# Patient Record
Sex: FEMALE | Race: WHITE | Employment: UNEMPLOYED | URBAN - METROPOLITAN AREA
[De-identification: names, ages, dates, MRNs, and addresses within clinical notes are randomized per-mention and may not be internally consistent; named-entity substitution may affect disease eponyms.]

---

## 2021-08-31 ENCOUNTER — OFFICE VISIT (OUTPATIENT)
Dept: NEUROLOGY | Age: 46
End: 2021-08-31
Payer: MEDICARE

## 2021-08-31 VITALS
BODY MASS INDEX: 27.58 KG/M2 | DIASTOLIC BLOOD PRESSURE: 96 MMHG | SYSTOLIC BLOOD PRESSURE: 146 MMHG | HEIGHT: 71 IN | WEIGHT: 197 LBS | HEART RATE: 77 BPM

## 2021-08-31 DIAGNOSIS — M47.816 LUMBAR SPONDYLOSIS: ICD-10-CM

## 2021-08-31 DIAGNOSIS — G37.9 CNS DEMYELINATING DISEASE (HCC): Primary | ICD-10-CM

## 2021-08-31 PROCEDURE — 99204 OFFICE O/P NEW MOD 45 MIN: CPT | Performed by: NURSE PRACTITIONER

## 2021-08-31 RX ORDER — DIAZEPAM 2 MG/1
2 TABLET ORAL EVERY 6 HOURS PRN
COMMUNITY

## 2021-08-31 RX ORDER — NAPROXEN SODIUM 550 MG/1
550 TABLET ORAL PRN
COMMUNITY

## 2021-08-31 RX ORDER — ACETAMINOPHEN 160 MG/5ML
SUSPENSION ORAL
COMMUNITY
Start: 2021-07-22

## 2021-08-31 NOTE — PROGRESS NOTES
time.           Testing reviewed:    CT Temporal Bones WO Contrast 1/11/2021  IMPRESSION:     There is significant thinning of the bony covering over the superior semicircular canal bilaterally suggesting dehiscence.  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 There is evidence of previous right temporal craniotomy, similar to the prior exam.       MRI Lumbar Spine 6/19/2021  Impression:   *  Multilevel degenerative disc and facet disease as described with no acute abnormality. *  Grade 1 anterolisthesis at L4-L5 with significant narrowing of the spinal canal, unchanged. *  Impingement of bilateral L5 nerve roots, also stable.              PAST MEDICAL HISTORY:         Diagnosis Date    GERD (gastroesophageal reflux disease)     Insulin resistance     Obesity     Osteoarthritis     Superior semicircular canal dehiscence     Vasodepressor syncope         PAST SURGICAL HISTORY:         Procedure Laterality Date    CHOLECYSTECTOMY      NASAL SEPTUM SURGERY      TONSILLECTOMY          SOCIAL HISTORY:     Social History     Socioeconomic History    Marital status: Single     Spouse name: Not on file    Number of children: Not on file    Years of education: Not on file    Highest education level: Not on file   Occupational History    Not on file   Tobacco Use    Smoking status: Light Tobacco Smoker     Packs/day: 0.00     Types: Cigarettes    Smokeless tobacco: Never Used   Vaping Use    Vaping Use: Never used   Substance and Sexual Activity    Alcohol use: Yes     Comment: jones pt states that she is willing to avoid for at least 6 [facility]    Drug use: No    Sexual activity: Not on file   Other Topics Concern    Not on file   Social History Narrative    Not on file     Social Determinants of Health     Financial Resource Strain:     Difficulty of Paying Living Expenses:    Food Insecurity:     Worried About Running Out of Food in the Last Year:     Ran Out of Food in the Last Year:    Transportation Needs:     Lack of Transportation (Medical):  Lack of Transportation (Non-Medical):    Physical Activity:     Days of Exercise per Week:     Minutes of Exercise per Session:    Stress:     Feeling of Stress :    Social Connections:     Frequency of Communication with Friends and Family:     Frequency of Social Gatherings with Friends and Family:     Attends [employer]oration[employer] Services:     Active Member of Clubs or Organizations:     Attends Club or Organization Meetings:     Marital Status:    Intimate Partner Violence:     Fear of Current or Ex-Partner:     Emotionally Abused:     Physically Abused:     Sexually Abused:        CURRENT MEDICATIONS:     Current Outpatient Medications   Medication Sig Dispense Refill    naproxen sodium (ANAPROX) 550 MG tablet Take 550 mg by mouth 2 times daily (with meals)      diazePAM (VALIUM) 2 MG tablet Take 2 mg by mouth every 6 hours as needed for Anxiety.  RA ALLERGY RELIEF, CETIRIZINE, 10 MG tablet take 1 tablet by mouth once daily if needed ALLERGY      Omeprazole (PRILOSEC PO) Take 40 mg by mouth daily. No current facility-administered medications for this visit. ALLERGIES:     Allergies   Allergen Reactions    Decongest-Aid [Pseudoephedrine]     Erythromycin     Predicort [Prednisolone]     Scopolamine     Soybean-Containing Drug Products     Tofranil [Imipramine Hcl]                           All items selected indicate a positive finding. Those items not selected are negative.   Constitutional [] Weight loss/gain   [] Fatigue  [] Fever/Chills   HEENT [] Hearing Loss  [] Visual Disturbance  [x] Tinnitus  [] Eye pain   Respiratory [] Shortness of Breath  [] Cough  [] Snoring   Cardiovascular [] Chest Pain  [] Palpitations  [] Lightheaded   GI [] Constipation  [] Diarrhea  [] Swallowing change  [] Nausea/vomiting    [x] Urinary Frequency  [x] Urinary Urgency   Musculoskeletal [x] Neck pain  [x] Back pain  [x] Muscle pain  [] Restless legs   Dermatologic [] Skin changes   Neurologic [] Memory loss/confusion  [] Seizures  [x] Trouble walking or imbalance  [x] Dizziness  [] Sleep disturbance  [x] Weakness  [x] Numbness  [] Tremors  [] Speech Difficulty  [] Headaches  [] Light Sensitivity  [] Sound Sensitivity   Endocrinology []Excessive thirst  []Excessive hunger   Psychiatric [] Anxiety/Depression  [] Hallucination   Allergy/immunology []Hives/environmental allergies   Hematologic/lymph [] Abnormal bleeding  [] Abnormal bruising                   PHYSICAL EXAMINATION:       Vitals:    08/31/21 0829   BP: (!) 146/96   Pulse: 77                                              .                                                                                                    General Appearance:  Alert, cooperative, no signs of distress, appears stated age   Head:  Normocephalic, no signs of trauma   Eyes:  Conjunctiva/corneas clear;  eyelids intact   Ears:  Normal external ear and canals   Nose: Nares normal, mucosa normal, no drainage    Throat: Lips and tongue normal; teeth normal;  gums normal   Neck: Supple, intact flexion, extension and rotation;   trachea midline;  no adenopathy;   thyroid: not enlarged;   no carotid pulse abnormality   Back:   Symmetric, no curvature, ROM adequate   Lungs:   Respirations unlabored   Heart:  Regular rate and rhythm           Extremities: Extremities normal, no cyanosis, no edema   Pulses: Symmetric over head and neck   Skin: Skin color, texture normal, no rashes, no lesions                                     NEUROLOGIC EXAMINATION    Neurologic Exam    Mental status    Alert and oriented x 3; intact memory with no confusion, speech or language problems; no hallucinations or delusions  Fund of information appropriate for level of education    Cranial nerves    II - visual fields intact to confrontation  III, IV, VI - extra-ocular muscles full: no pupillary defect; no CRISTINA, no nystagmus, no ptosis   V - normal facial sensation                                                               VII - normal facial symmetry                                                             VIII - intact hearing                                                                             IX, X - symmetrical palate                                                                  XI - symmetrical shoulder shrug                                                       XII - tongue midline without atrophy or fasciculation      Motor function  Normal muscle bulk and tone; strength 5/5 on all 4 extremities, no pronator drift      Sensory function Diminished pinprick sensation in left upper and lower extremity      Cerebellar Intact fine motor movement. No involuntary movements or tremors. No ataxia or dysmetria on finger to nose or heel to shin testing      Reflex function DTR 2+ on bilateral UE and LE, symmetric. Negative Babinski, negative ramírez, negative clonus       Gait                   normal base and arm swing              Medical Decision Making: Thank you very much for the kind referral of [facility]. I look forward to working with you in the neurological care of your patient. L4-5 spondylolisthesis with spinal stenosis. Patient has recently seen orthopedic surgery who recommended an L3-L5 spinal fusion and laminectomy. The patient is reluctant and would like to have a second opinion  Referral to Dr. Jonathan Cleary was made at today's visit  Bilateral superior semicircular canal dehiscence  The patient previously had a plate placed on the right and has a surgery in the near future for the left  Possible multiple sclerosis, the patient previously had a lumbar puncture however she is unsure if she had positive oligoclonal bands. She did have lesions on her brain MRI, but is uncertain regarding her spine.   On clinical exam, the patient currently has hemisensory loss on the left.  Obtain records from Dr. Lizz Pan and Dr. Kristy Keating  MRI of the brain with and without contrast  MRI of the cervical spine with and without contrast    Signed: Cheyenne Serrano CNP  Please note that this chart was generated using voice recognition Dragon dictation software. Although every effort was made to ensure the accuracy of this automated transcription, some errors in transcription may have occurred. Provider Attestation: The documentation recorded by the scribe accurately reflects the service I personally performed and the decisions made by myself. Portions of this note were transcribed by a scribe. I personally performed the history, physical exam, and medical decision-making and confirm the accuracy of the information in the transcribed note. Scribe Attestation:   By signing my name below, Branden Galan, attest that this documentation has been prepared under the direction and in the presence of Cheyenne Serrano CNP.

## 2021-09-11 ENCOUNTER — HOSPITAL ENCOUNTER (EMERGENCY)
Facility: CLINIC | Age: 46
Discharge: HOME | End: 2021-09-11
Attending: EMERGENCY MEDICINE
Payer: MEDICARE

## 2021-09-11 ENCOUNTER — APPOINTMENT (OUTPATIENT)
Dept: CT IMAGING | Facility: CLINIC | Age: 46
End: 2021-09-11
Payer: MEDICARE

## 2021-09-11 VITALS
BODY MASS INDEX: 26.88 KG/M2 | HEART RATE: 99 BPM | DIASTOLIC BLOOD PRESSURE: 93 MMHG | HEIGHT: 71 IN | TEMPERATURE: 98.5 F | SYSTOLIC BLOOD PRESSURE: 140 MMHG | OXYGEN SATURATION: 100 % | WEIGHT: 192 LBS | RESPIRATION RATE: 18 BRPM

## 2021-09-11 DIAGNOSIS — E86.0 DEHYDRATION: ICD-10-CM

## 2021-09-11 DIAGNOSIS — R19.7 DIARRHEA OF PRESUMED INFECTIOUS ORIGIN: Primary | ICD-10-CM

## 2021-09-11 LAB
ABSOLUTE EOS #: 0.1 K/UL (ref 0–0.4)
ABSOLUTE IMMATURE GRANULOCYTE: ABNORMAL K/UL (ref 0–0.3)
ABSOLUTE LYMPH #: 0.7 K/UL (ref 1–4.8)
ABSOLUTE MONO #: 0.7 K/UL (ref 0.1–1.2)
ALBUMIN SERPL-MCNC: 4.5 G/DL (ref 3.5–5.2)
ALBUMIN/GLOBULIN RATIO: 1.3 (ref 1–2.5)
ALP BLD-CCNC: 75 U/L (ref 35–104)
ALT SERPL-CCNC: 12 U/L (ref 5–33)
ANION GAP SERPL CALCULATED.3IONS-SCNC: 15 MMOL/L (ref 9–17)
AST SERPL-CCNC: 30 U/L
BASOPHILS # BLD: 0 % (ref 0–2)
BASOPHILS ABSOLUTE: 0 K/UL (ref 0–0.2)
BILIRUB SERPL-MCNC: 0.6 MG/DL (ref 0.3–1.2)
BILIRUBIN URINE: NEGATIVE
BUN BLDV-MCNC: 7 MG/DL (ref 6–20)
BUN/CREAT BLD: ABNORMAL (ref 9–20)
CALCIUM SERPL-MCNC: 9.6 MG/DL (ref 8.6–10.4)
CHLORIDE BLD-SCNC: 103 MMOL/L (ref 98–107)
CO2: 19 MMOL/L (ref 20–31)
COLOR: YELLOW
COMMENT UA: ABNORMAL
CREAT SERPL-MCNC: 0.5 MG/DL (ref 0.5–0.9)
DATE, STOOL #1: NORMAL
DATE, STOOL #2: NORMAL
DATE, STOOL #3: NORMAL
DIFFERENTIAL TYPE: ABNORMAL
DIRECT EXAM: NORMAL
EOSINOPHILS RELATIVE PERCENT: 1 % (ref 1–4)
GFR AFRICAN AMERICAN: >60 ML/MIN
GFR NON-AFRICAN AMERICAN: >60 ML/MIN
GFR SERPL CREATININE-BSD FRML MDRD: ABNORMAL ML/MIN/{1.73_M2}
GFR SERPL CREATININE-BSD FRML MDRD: ABNORMAL ML/MIN/{1.73_M2}
GLUCOSE BLD-MCNC: 89 MG/DL (ref 70–99)
GLUCOSE URINE: NEGATIVE
HCG QUALITATIVE: NEGATIVE
HCT VFR BLD CALC: 34.5 % (ref 36–46)
HEMOCCULT SP1 STL QL: NEGATIVE
HEMOCCULT SP2 STL QL: NORMAL
HEMOCCULT SP3 STL QL: NORMAL
HEMOGLOBIN: 10.8 G/DL (ref 12–16)
IMMATURE GRANULOCYTES: ABNORMAL %
KETONES, URINE: ABNORMAL
LEUKOCYTE ESTERASE, URINE: NEGATIVE
LIPASE: 37 U/L (ref 13–60)
LYMPHOCYTES # BLD: 8 % (ref 24–44)
Lab: NORMAL
MCH RBC QN AUTO: 22.9 PG (ref 26–34)
MCHC RBC AUTO-ENTMCNC: 31.2 G/DL (ref 31–37)
MCV RBC AUTO: 73.3 FL (ref 80–100)
MONOCYTES # BLD: 8 % (ref 2–11)
NITRITE, URINE: NEGATIVE
NRBC AUTOMATED: ABNORMAL PER 100 WBC
PDW BLD-RTO: 18.6 % (ref 12.5–15.4)
PH UA: 5 (ref 5–8)
PLATELET # BLD: 215 K/UL (ref 140–450)
PLATELET ESTIMATE: ABNORMAL
PMV BLD AUTO: 9 FL (ref 6–12)
POTASSIUM SERPL-SCNC: 3.9 MMOL/L (ref 3.7–5.3)
PROTEIN UA: NEGATIVE
RBC # BLD: 4.71 M/UL (ref 4–5.2)
RBC # BLD: ABNORMAL 10*6/UL
SEG NEUTROPHILS: 83 % (ref 36–66)
SEGMENTED NEUTROPHILS ABSOLUTE COUNT: 7.7 K/UL (ref 1.8–7.7)
SODIUM BLD-SCNC: 137 MMOL/L (ref 135–144)
SPECIFIC GRAVITY UA: 1.02 (ref 1–1.03)
SPECIMEN DESCRIPTION: NORMAL
TIME, STOOL #1: 1523
TIME, STOOL #2: NORMAL
TIME, STOOL #3: NORMAL
TOTAL PROTEIN: 8.1 G/DL (ref 6.4–8.3)
TURBIDITY: CLEAR
URINE HGB: NEGATIVE
UROBILINOGEN, URINE: NORMAL
WBC # BLD: 9.2 K/UL (ref 3.5–11)
WBC # BLD: ABNORMAL 10*3/UL

## 2021-09-11 PROCEDURE — 74177 CT ABD & PELVIS W/CONTRAST: CPT

## 2021-09-11 PROCEDURE — 85025 COMPLETE CBC W/AUTO DIFF WBC: CPT

## 2021-09-11 PROCEDURE — 81003 URINALYSIS AUTO W/O SCOPE: CPT

## 2021-09-11 PROCEDURE — 2580000003 HC RX 258: Performed by: EMERGENCY MEDICINE

## 2021-09-11 PROCEDURE — 87328 CRYPTOSPORIDIUM AG IA: CPT

## 2021-09-11 PROCEDURE — 96360 HYDRATION IV INFUSION INIT: CPT

## 2021-09-11 PROCEDURE — 6360000004 HC RX CONTRAST MEDICATION: Performed by: EMERGENCY MEDICINE

## 2021-09-11 PROCEDURE — 99284 EMERGENCY DEPT VISIT MOD MDM: CPT

## 2021-09-11 PROCEDURE — 87329 GIARDIA AG IA: CPT

## 2021-09-11 PROCEDURE — 80053 COMPREHEN METABOLIC PANEL: CPT

## 2021-09-11 PROCEDURE — 36415 COLL VENOUS BLD VENIPUNCTURE: CPT

## 2021-09-11 PROCEDURE — 6360000002 HC RX W HCPCS: Performed by: EMERGENCY MEDICINE

## 2021-09-11 PROCEDURE — 87425 ROTAVIRUS AG IA: CPT

## 2021-09-11 PROCEDURE — 96372 THER/PROPH/DIAG INJ SC/IM: CPT

## 2021-09-11 PROCEDURE — 83690 ASSAY OF LIPASE: CPT

## 2021-09-11 PROCEDURE — 87506 IADNA-DNA/RNA PROBE TQ 6-11: CPT

## 2021-09-11 PROCEDURE — 96361 HYDRATE IV INFUSION ADD-ON: CPT

## 2021-09-11 PROCEDURE — 87493 C DIFF AMPLIFIED PROBE: CPT

## 2021-09-11 PROCEDURE — 84703 CHORIONIC GONADOTROPIN ASSAY: CPT

## 2021-09-11 PROCEDURE — 82272 OCCULT BLD FECES 1-3 TESTS: CPT

## 2021-09-11 RX ORDER — SODIUM CHLORIDE 0.9 % (FLUSH) 0.9 %
10 SYRINGE (ML) INJECTION PRN
Status: DISCONTINUED | OUTPATIENT
Start: 2021-09-11 | End: 2021-09-11 | Stop reason: HOSPADM

## 2021-09-11 RX ORDER — DICYCLOMINE HYDROCHLORIDE 10 MG/ML
20 INJECTION INTRAMUSCULAR ONCE
Status: COMPLETED | OUTPATIENT
Start: 2021-09-11 | End: 2021-09-11

## 2021-09-11 RX ORDER — DICYCLOMINE HYDROCHLORIDE 10 MG/1
20 CAPSULE ORAL EVERY 6 HOURS PRN
Qty: 30 CAPSULE | Refills: 0 | Status: SHIPPED | OUTPATIENT
Start: 2021-09-11

## 2021-09-11 RX ORDER — 0.9 % SODIUM CHLORIDE 0.9 %
1000 INTRAVENOUS SOLUTION INTRAVENOUS ONCE
Status: COMPLETED | OUTPATIENT
Start: 2021-09-11 | End: 2021-09-11

## 2021-09-11 RX ORDER — 0.9 % SODIUM CHLORIDE 0.9 %
70 INTRAVENOUS SOLUTION INTRAVENOUS ONCE
Status: COMPLETED | OUTPATIENT
Start: 2021-09-11 | End: 2021-09-11

## 2021-09-11 RX ADMIN — Medication 10 ML: at 12:56

## 2021-09-11 RX ADMIN — SODIUM CHLORIDE 1000 ML: 9 INJECTION, SOLUTION INTRAVENOUS at 12:22

## 2021-09-11 RX ADMIN — SODIUM CHLORIDE 1000 ML: 9 INJECTION, SOLUTION INTRAVENOUS at 14:08

## 2021-09-11 RX ADMIN — SODIUM CHLORIDE 70 ML: 9 INJECTION, SOLUTION INTRAVENOUS at 12:56

## 2021-09-11 RX ADMIN — IOPAMIDOL 75 ML: 755 INJECTION, SOLUTION INTRAVENOUS at 12:55

## 2021-09-11 RX ADMIN — DICYCLOMINE HYDROCHLORIDE 20 MG: 10 INJECTION, SOLUTION INTRAMUSCULAR at 12:22

## 2021-09-11 ASSESSMENT — PAIN SCALES - GENERAL: PAINLEVEL_OUTOF10: 4

## 2021-09-11 ASSESSMENT — PAIN DESCRIPTION - LOCATION: LOCATION: ABDOMEN

## 2021-09-11 ASSESSMENT — PAIN DESCRIPTION - PAIN TYPE: TYPE: ACUTE PAIN

## 2021-09-11 NOTE — ED PROVIDER NOTES
predicort [prednisolone], scopolamine, soybean-containing drug products, and tofranil [imipramine hcl]. FAMILY HISTORY     She indicated that the status of her mother is unknown. She indicated that the status of her maternal grandmother is unknown. She indicated that the status of her maternal grandfather is unknown.     family history includes Arthritis in her mother; Cancer in her maternal grandfather; Depression in her mother; Diabetes in her maternal grandmother; High Cholesterol in her mother. SOCIAL HISTORY      reports that she has been smoking cigarettes. She has been smoking about 0.00 packs per day. She has never used smokeless tobacco. She reports current alcohol use. She reports that she does not use drugs. PHYSICAL EXAM     INITIAL VITALS:  height is 5' 11\" (1.803 m) and weight is 87.1 kg (192 lb). Her oral temperature is 98.5 °F (36.9 °C). Her blood pressure is 140/93 (abnormal) and her pulse is 99. Her respiration is 18 and oxygen saturation is 100%. Physical Exam  Vitals reviewed. Constitutional:       General: She is not in acute distress. Appearance: She is well-developed. HENT:      Head: Normocephalic and atraumatic. Eyes:      Conjunctiva/sclera: Conjunctivae normal.      Pupils: Pupils are equal, round, and reactive to light. Neck:      Trachea: No tracheal deviation. Cardiovascular:      Rate and Rhythm: Normal rate and regular rhythm. Pulmonary:      Effort: No respiratory distress. Breath sounds: Normal breath sounds. Abdominal:      General: Bowel sounds are normal. There is no distension. Palpations: Abdomen is soft. Tenderness: There is no abdominal tenderness. Comments: While her abdomen is soft and nontender to palpation, after I palpated she starts clutching her belly complaining of cramping pain. Musculoskeletal:         General: No tenderness. Normal range of motion. Cervical back: Normal range of motion and neck supple. Skin:     General: Skin is warm and dry. Neurological:      Mental Status: She is alert and oriented to person, place, and time. Psychiatric:         Behavior: Behavior normal.         Thought Content: Thought content normal.         Judgment: Judgment normal.           DIFFERENTIAL DIAGNOSIS/ MDM:   I am worried about infectious or parasitic diarrhea causing this patient's presenting complaint. DIAGNOSTIC RESULTS     EKG:  None    RADIOLOGY:   CT ABDOMEN PELVIS W IV CONTRAST Additional Contrast? None    Result Date: 9/11/2021  EXAMINATION: CT OF THE ABDOMEN AND PELVIS WITH CONTRAST 9/11/2021 12:41 pm TECHNIQUE: CT of the abdomen and pelvis was performed with the administration of intravenous contrast. Multiplanar reformatted images are provided for review. Dose modulation, iterative reconstruction, and/or weight based adjustment of the mA/kV was utilized to reduce the radiation dose to as low as reasonably achievable. COMPARISON: None. HISTORY: ORDERING SYSTEM PROVIDED HISTORY: Upper abdominal pain TECHNOLOGIST PROVIDED HISTORY: Upper abdominal pain Decision Support Exception - unselect if not a suspected or confirmed emergency medical condition->Emergency Medical Condition (MA) Reason for Exam: Pt. C/o abdominal pain and diarrhea. Acuity: Acute Type of Exam: Initial FINDINGS: Lower Chest: Inferior lung bases are clear. Heart size is normal. Organs: Slight to mild diffuse low-density of the liver is present without worrisome focal lesion. Gallbladder surgically absent. Kidneys and other abdominal organs appear normal.  2 displaced surgical clips adjacent to the upper medial portion of the spleen. GI/Bowel: Suture line present on the stomach suggestive of gastric sleeve surgery. Appendix and terminal ileum appear normal.  Colon is filled with liquid stool. Mild fluid-filled distension of pelvic small bowel loops also present. Wall thickening evident in these loops.  Pelvis: Uterus and urinary bladder appear normal.  No mass, adenopathy, or free fluid. Peritoneum/Retroperitoneum: No mass, adenopathy, or ascites. Normal aorta. No free air. Bones/Soft Tissues: No acute abnormality. Fluid-filled distal small bowel and colon suggestive of acute enterocolitis. No other significant abnormality identified.          LABS:  Results for orders placed or performed during the hospital encounter of 09/11/21   CBC Auto Differential   Result Value Ref Range    WBC 9.2 3.5 - 11.0 k/uL    RBC 4.71 4.0 - 5.2 m/uL    Hemoglobin 10.8 (L) 12.0 - 16.0 g/dL    Hematocrit 34.5 (L) 36 - 46 %    MCV 73.3 (L) 80 - 100 fL    MCH 22.9 (L) 26 - 34 pg    MCHC 31.2 31 - 37 g/dL    RDW 18.6 (H) 12.5 - 15.4 %    Platelets 750 212 - 186 k/uL    MPV 9.0 6.0 - 12.0 fL    NRBC Automated NOT REPORTED per 100 WBC    Differential Type NOT REPORTED     Seg Neutrophils 83 (H) 36 - 66 %    Lymphocytes 8 (L) 24 - 44 %    Monocytes 8 2 - 11 %    Eosinophils % 1 1 - 4 %    Basophils 0 0 - 2 %    Immature Granulocytes NOT REPORTED 0 %    Segs Absolute 7.70 1.8 - 7.7 k/uL    Absolute Lymph # 0.70 (L) 1.0 - 4.8 k/uL    Absolute Mono # 0.70 0.1 - 1.2 k/uL    Absolute Eos # 0.10 0.0 - 0.4 k/uL    Basophils Absolute 0.00 0.0 - 0.2 k/uL    Absolute Immature Granulocyte NOT REPORTED 0.00 - 0.30 k/uL    WBC Morphology NOT REPORTED     RBC Morphology NOT REPORTED     Platelet Estimate NOT REPORTED    Comprehensive Metabolic Panel w/ Reflex to MG   Result Value Ref Range    Glucose 89 70 - 99 mg/dL    BUN 7 6 - 20 mg/dL    CREATININE 0.50 0.50 - 0.90 mg/dL    Bun/Cre Ratio NOT REPORTED 9 - 20    Calcium 9.6 8.6 - 10.4 mg/dL    Sodium 137 135 - 144 mmol/L    Potassium 3.9 3.7 - 5.3 mmol/L    Chloride 103 98 - 107 mmol/L    CO2 19 (L) 20 - 31 mmol/L    Anion Gap 15 9 - 17 mmol/L    Alkaline Phosphatase 75 35 - 104 U/L    ALT 12 5 - 33 U/L    AST 30 <32 U/L    Total Bilirubin 0.60 0.3 - 1.2 mg/dL    Total Protein 8.1 6.4 - 8.3 g/dL    Albumin 4.5 3.5 - 5.2 g/dL Weight: 87.1 kg (192 lb)    Height: 5' 11\" (1.803 m)      -------------------------  BP: (!) 140/93, Temp: 98.5 °F (36.9 °C), Pulse: 99, Resp: 18      Re-evaluation Notes  3:14 PM EDT  Patient states that she feels better. Belly remains nonsurgical.  I feel most likely symptoms are viral.  She thinks that she can produce a little diarrhea here and I will have the lab interrogated. Otherwise I will treat her symptomatically. CONSULTS:  None    CRITICAL CARE:   None    PROCEDURES:  None    FINAL IMPRESSION      1. Diarrhea of presumed infectious origin    2.  Dehydration          DISPOSITION/PLAN   DISPOSITION Decision To Discharge 09/11/2021 03:15:11 PM      Condition on Disposition  Good    PATIENT REFERRED TO:  Latricia Epley, [address]  [phone]    Schedule an appointment as soon as possible for a visit in 2 days        DISCHARGE MEDICATIONS:  [unfilled]    (Please note that portions of this note were completed with a voice recognitionprogram.  Efforts were made to edit the dictations but occasionally words are mis-transcribed.)    Rolanda Menon MD MD, F.A.C.E.P, F.A.A.E.M  Emergency Physician Attending         Rolanda Menon MD  09/11/21 [id number]

## 2021-09-12 LAB
C DIFFICILE TOXINS, PCR: NORMAL
CAMPYLOBACTER PCR: ABNORMAL
E COLI ENTEROTOXIGENIC PCR: ABNORMAL
PLESIOMONAS SHIGELLOIDES PCR: ABNORMAL
SALMONELLA PCR: ABNORMAL
SHIGATOXIN GENE PCR: ABNORMAL
SHIGELLA SP PCR: ABNORMAL
SPECIMEN DESCRIPTION: ABNORMAL
SPECIMEN DESCRIPTION: NORMAL
VIBRIO PCR: ABNORMAL
YERSINIA ENTEROCOLITICA PCR: ABNORMAL

## 2021-09-13 LAB
DIRECT EXAM: NORMAL
DIRECT EXAM: NORMAL
Lab: NORMAL
SPECIMEN DESCRIPTION: NORMAL

## 2021-10-27 ENCOUNTER — HOSPITAL ENCOUNTER (OUTPATIENT)
Dept: MRI IMAGING | Facility: CLINIC | Age: 46
Discharge: HOME | End: 2021-10-29
Payer: MEDICARE

## 2021-10-27 DIAGNOSIS — G37.9 CNS DEMYELINATING DISEASE (HCC): ICD-10-CM

## 2021-10-29 ENCOUNTER — HOSPITAL ENCOUNTER (OUTPATIENT)
Dept: MRI IMAGING | Age: 46
Discharge: HOME | End: 2021-10-31
Payer: MEDICARE

## 2021-10-29 PROCEDURE — 72156 MRI NECK SPINE W/O & W/DYE: CPT

## 2021-10-29 PROCEDURE — 70553 MRI BRAIN STEM W/O & W/DYE: CPT

## 2021-10-29 PROCEDURE — A9579 GAD-BASE MR CONTRAST NOS,1ML: HCPCS | Performed by: NURSE PRACTITIONER

## 2021-10-29 PROCEDURE — 2580000003 HC RX 258: Performed by: NURSE PRACTITIONER

## 2021-10-29 PROCEDURE — 6360000004 HC RX CONTRAST MEDICATION: Performed by: NURSE PRACTITIONER

## 2021-10-29 RX ORDER — SODIUM CHLORIDE 0.9 % (FLUSH) 0.9 %
10 SYRINGE (ML) INJECTION PRN
Status: DISCONTINUED | OUTPATIENT
Start: 2021-10-29 | End: 2021-11-01 | Stop reason: HOSPADM

## 2021-10-29 RX ADMIN — GADOTERIDOL 20 ML: 279.3 INJECTION, SOLUTION INTRAVENOUS at 13:03

## 2021-10-29 RX ADMIN — SODIUM CHLORIDE, PRESERVATIVE FREE 10 ML: 5 INJECTION INTRAVENOUS at 13:03

## 2021-11-01 ENCOUNTER — OFFICE VISIT (OUTPATIENT)
Dept: NEUROLOGY | Age: 46
End: 2021-11-01
Payer: MEDICARE

## 2021-11-01 VITALS
BODY MASS INDEX: 26.6 KG/M2 | DIASTOLIC BLOOD PRESSURE: 93 MMHG | WEIGHT: 190 LBS | HEIGHT: 71 IN | HEART RATE: 77 BPM | SYSTOLIC BLOOD PRESSURE: 148 MMHG

## 2021-11-01 DIAGNOSIS — M47.22 OSTEOARTHRITIS OF SPINE WITH RADICULOPATHY, CERVICAL REGION: ICD-10-CM

## 2021-11-01 DIAGNOSIS — H83.8X9 SUPERIOR SEMICIRCULAR CANAL DEHISCENCE, UNSPECIFIED LATERALITY: ICD-10-CM

## 2021-11-01 DIAGNOSIS — M47.816 LUMBAR SPONDYLOSIS: Primary | ICD-10-CM

## 2021-11-01 PROBLEM — G37.9 CNS DEMYELINATING DISEASE (HCC): Status: RESOLVED | Noted: 2021-08-31 | Resolved: 2021-11-01

## 2021-11-01 PROCEDURE — 99214 OFFICE O/P EST MOD 30 MIN: CPT | Performed by: NURSE PRACTITIONER

## 2021-11-01 RX ORDER — GABAPENTIN 300 MG/1
300 CAPSULE ORAL 3 TIMES DAILY
Qty: 90 CAPSULE | Refills: 5 | Status: SHIPPED | OUTPATIENT
Start: 2021-11-01 | End: 2022-04-30

## 2021-11-01 NOTE — PROGRESS NOTES
[facility]            [address]          [place], [address] [facility]          Dept: [phone]          Dept Fax: [phone]    MD Tomas Herzog MD Ahmed B. Valeda Ip, MD Tomas Goldstein, MD Eladio Null, CNP            11/1/2021      HISTORY OF PRESENT ILLNESS:       I had the pleasure of seeing Sandrine Ramesh, who returns for continuing neurologic care. The patient was seen last on August 31, 2021 for treatment of L4-5 spondylolisthesis with spinal stenosis, bilateral superior semicircular dehiscence and possible multiple sclerosis. For management of her L4-5 spondylolisthesis she was referred to Dr. Chana Lopez at her last visit who recommended a L4-5 and L5-S1 decompressive laminectomy. She reports today that she has surgery scheduled in the future. For management of her possible multiple sclerosis she recently completed an MRI of her cervical spine which showed multilevel cervical spondylosis most pronounced at C5-6 resulting in mild right cord compression. There was no evidence of demyelination on her MRI. She also completed an MRI of her brain which was normal except for evidence of a prior craniotomy. For management of her bilateral superior semicircular canal dehiscence she previously had a plate placed on the right and has surgery in the near future for the left. Testing reviewed:    MRI Brain W WO Contrast 10/29/2021  Impression   1.  No acute intracranial abnormality. 2. No evidence of demyelinating disease. 3. Minimal gliosis in the inferior right temporal lobe, adjacent to a site of   right temporal craniotomy.  Please correlate with prior surgical history. 4. 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 Multilevel cervical spondylosis most pronounced at C5-6 resulting in   eccentric right mild cord compression. 2. Severe right foraminal stenosis at C5-6.   3. No abnormal cord signal to suggest demyelination. PAST MEDICAL HISTORY:         Diagnosis Date    GERD (gastroesophageal reflux disease)     Insulin resistance     Obesity     Osteoarthritis     Superior semicircular canal dehiscence     Vasodepressor syncope         PAST SURGICAL HISTORY:         Procedure Laterality Date    CHOLECYSTECTOMY      NASAL SEPTUM SURGERY      TONSILLECTOMY          SOCIAL HISTORY:     Social History     Socioeconomic History    Marital status:      Spouse name: Not on file    Number of children: Not on file    Years of education: Not on file    Highest education level: Not on file   Occupational History    Not on file   Tobacco Use    Smoking status: Light Tobacco Smoker     Packs/day: 0.00     Types: Cigarettes    Smokeless tobacco: Never Used   Vaping Use    Vaping Use: Never used   Substance and Sexual Activity    Alcohol use: Yes     Comment: jones pt states that she is willing to avoid for at least 6 monspost jaime surg    Drug use: No    Sexual activity: Not on file   Other Topics Concern    Not on file   Social History Narrative    Not on file     Social Determinants of Health     Financial Resource Strain:     Difficulty of Paying Living Expenses:    Food Insecurity:     Worried About Running Out of Food in the Last Year:     Leroy of Food in the Last Year:    Transportation Needs:     Lack of Transportation (Medical):      Lack of Transportation (Non-Medical):    Physical Activity:     Days of Exercise per Week:     Minutes of Exercise per Session:    Stress:     Feeling of Stress :    Social Connections:     Frequency of Communication with Friends and Family:     Frequency of Social Gatherings with Friends and Family:     Attends Bahai Services:     Active Member of Clubs or Organizations:     Attends Club or Organization Meetings:     Marital Status:    Intimate Partner Violence:     Fear of Current or Ex-Partner:     Emotionally Abused:     Physically Abused:     Sexually Abused:        CURRENT MEDICATIONS:     Current Outpatient Medications   Medication Sig Dispense Refill    gabapentin (NEURONTIN) 300 MG capsule Take 1 capsule by mouth 3 times daily for 180 days. Intended supply: 30 days 90 capsule 5    naproxen sodium (ANAPROX) 550 MG tablet Take 550 mg by mouth as needed       diazePAM (VALIUM) 2 MG tablet Take 2 mg by mouth every 6 hours as needed for Anxiety.  Omeprazole (PRILOSEC PO) Take 40 mg by mouth daily.  dicyclomine (BENTYL) 10 MG capsule Take 2 capsules by mouth every 6 hours as needed (cramps) (Patient not taking: Reported on 11/1/2021) 30 capsule 0    RA ALLERGY RELIEF, CETIRIZINE, 10 MG tablet take 1 tablet by mouth once daily if needed ALLERGY (Patient not taking: Reported on 11/1/2021)       No current facility-administered medications for this visit. ALLERGIES:     Allergies   Allergen Reactions    Decongest-Aid [Pseudoephedrine]     Erythromycin     Predicort [Prednisolone]     Scopolamine     Soybean-Containing Drug Products     Tofranil [Imipramine Hcl]                                  REVIEW OF SYSTEMS        All items selected indicate a positive finding. Those items not selected are negative.   Constitutional [] Weight loss/gain   [] Fatigue  [] Fever/Chills   HEENT [] Hearing Loss  [] Visual Disturbance  [] Tinnitus  [] Eye pain   Respiratory [] Shortness of Breath  [] Cough  [] Snoring   Cardiovascular [] Chest Pain  [] Palpitations  [] Lightheaded   GI [] Constipation  [] Diarrhea  [] Swallowing change  [] Nausea/vomiting    [] Urinary Frequency  [] Urinary Urgency   Musculoskeletal [x] Neck pain  [x] Back pain  [x] Muscle pain  [] Restless legs   Dermatologic [] Skin changes   Neurologic [] Memory loss/confusion  [] Seizures  [x] Trouble walking or imbalance  [x] Dizziness  [x] Sleep disturbance  [x] Weakness  [x] Numbness  [] Tremors  [] Speech Difficulty  [] Headaches  [] Light Sensitivity  [] Sound Sensitivity   Endocrinology []Excessive thirst  []Excessive hunger   Psychiatric [] Anxiety/Depression  [] Hallucination   Allergy/immunology []Hives/environmental allergies   Hematologic/lymph [] Abnormal bleeding  [] Abnormal bruising         PHYSICAL EXAMINATION:       Vitals:    11/01/21 0839   BP: (!) 148/93   Pulse: 77                                              .                                                                                                    General Appearance:  Alert, cooperative, no signs of distress, appears stated age   Head:  Normocephalic, no signs of trauma   Eyes:  Conjunctiva/corneas clear;  eyelids intact   Ears:  Normal external ear and canals   Nose: Nares normal, mucosa normal, no drainage    Throat: Lips and tongue normal; teeth normal;  gums normal   Neck: Supple, intact flexion, extension and rotation;   trachea midline;  no adenopathy;   thyroid: not enlarged;   no carotid pulse abnormality   Back:   Symmetric, no curvature, ROM adequate   Lungs:   Respirations unlabored   Heart:  Regular rate and rhythm           Extremities: Extremities normal, no cyanosis, no edema   Pulses: Symmetric over head and neck   Skin: Skin color, texture normal, no rashes, no lesions                                     NEUROLOGIC EXAMINATION    Neurologic Exam  Mental status    Alert and oriented x 3; intact memory with no confusion, speech or language problems; no hallucinations or delusions  Fund of information appropriate for level of education    Cranial nerves    II - visual fields intact to confrontation bilaterally  III, IV, VI - extra-ocular muscles full: no pupillary defect; no CRISTINA, no nystagmus, no ptosis   V - normal facial sensation                                                               VII - normal facial symmetry VIII - intact hearing                                                                             IX, X - symmetrical palate                                                                  XI - symmetrical shoulder shrug                                                       XII - tongue midline without atrophy or fasciculation      Motor function  Normal muscle bulk and tone; strength 5/5 on all 4 extremities, no pronator drift      Sensory function Intact to light touch, pinprick, vibration, proprioception on all 4 extremities      Cerebellar Intact fine motor movement. No involuntary movements or tremors. No ataxia or dysmetria on finger to nose or heel to shin testing      Reflex function DTR 2+ on bilateral UE and LE, symmetric. Down going toes bilaterally      Gait                   normal base and arm swing                  Medical Decision Making: In summary, your patient, Stephanie Eckert exhibits the following, with associated plan:      L4-5 spondylolisthesis with spinal stenosis. Patient has recently seen Dr. Pawel Soto who plans a future L4-5 and L5-S1 decompressive laminectomyplacement of interbody at L4-5, and instrumented fusion from L4-S1.   Continue to follow with Dr. Pawel Soto who plans future surgery   Start gabapentin 300 mg three times daily  Possible sleep disorder  Referral made to Dr. Skip Gerber at today's visit, recommended she obtain her previously completed baseline diagnostic sleep studies  Bilateral superior semicircular canal dehiscence  The patient previously had a plate placed on the right and has a surgery in the near future for the left  Cervical stenosis    Patient will provide her MRI images to Dr. Pawel Soto at her next visit regarding her cervical spine imaging  Return for follow up visit in three months          Signed: Henry Soto CNP      *Please note that portions of this note were completed with a voice recognition program.  Although every effort was made to insure the accuracy of this automated transcription, some errors in transcription may have occurred, occasionally words and are mis-transcribed    Provider Attestation: The documentation recorded by the scribe accurately reflects the service I personally performed and the decisions made by myself. Portions of this note were transcribed by a scribe. I personally performed the history, physical exam, and medical decision-making and confirm the accuracy of the information in the transcribed note. Scribe Attestation:   By signing my name below, Edouard Jack, attest that this documentation has been prepared under the direction and in the presence of Husam Myers CNP.

## 2021-11-18 ENCOUNTER — OFFICE VISIT (OUTPATIENT)
Dept: NEUROLOGY | Age: 46
End: 2021-11-18
Payer: MEDICARE

## 2021-11-18 VITALS
DIASTOLIC BLOOD PRESSURE: 106 MMHG | HEIGHT: 71 IN | HEART RATE: 67 BPM | RESPIRATION RATE: 16 BRPM | SYSTOLIC BLOOD PRESSURE: 170 MMHG | WEIGHT: 198.6 LBS | BODY MASS INDEX: 27.8 KG/M2

## 2021-11-18 DIAGNOSIS — G25.81 RLS (RESTLESS LEGS SYNDROME): ICD-10-CM

## 2021-11-18 DIAGNOSIS — G47.19 EXCESSIVE DAYTIME SLEEPINESS: ICD-10-CM

## 2021-11-18 DIAGNOSIS — F51.3 SLEEP WALKING: Primary | ICD-10-CM

## 2021-11-18 PROCEDURE — 99215 OFFICE O/P EST HI 40 MIN: CPT | Performed by: STUDENT IN AN ORGANIZED HEALTH CARE EDUCATION/TRAINING PROGRAM

## 2021-11-18 ASSESSMENT — SLEEP AND FATIGUE QUESTIONNAIRES
HOW LIKELY ARE YOU TO NOD OFF OR FALL ASLEEP WHILE SITTING AND TALKING TO SOMEONE: 1
HOW LIKELY ARE YOU TO NOD OFF OR FALL ASLEEP WHILE LYING DOWN TO REST IN THE AFTERNOON WHEN CIRCUMSTANCES PERMIT: 2
HOW LIKELY ARE YOU TO NOD OFF OR FALL ASLEEP IN A CAR, WHILE STOPPED FOR A FEW MINUTES IN TRAFFIC: 0
ESS TOTAL SCORE: 16
HOW LIKELY ARE YOU TO NOD OFF OR FALL ASLEEP WHILE SITTING INACTIVE IN A PUBLIC PLACE: 2
HOW LIKELY ARE YOU TO NOD OFF OR FALL ASLEEP WHILE SITTING AND READING: 3
HOW LIKELY ARE YOU TO NOD OFF OR FALL ASLEEP WHILE WATCHING TV: 3
HOW LIKELY ARE YOU TO NOD OFF OR FALL ASLEEP WHEN YOU ARE A PASSENGER IN A CAR FOR AN HOUR WITHOUT A BREAK: 3
HOW LIKELY ARE YOU TO NOD OFF OR FALL ASLEEP WHILE SITTING QUIETLY AFTER LUNCH WITHOUT ALCOHOL: 2

## 2021-11-18 ASSESSMENT — ENCOUNTER SYMPTOMS
RESPIRATORY NEGATIVE: 1
EYES NEGATIVE: 1
GASTROINTESTINAL NEGATIVE: 1
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
## 2021-11-18 NOTE — ASSESSMENT & PLAN NOTE
Symptoms consistent with restless leg syndrome. Patient had some labs consistent with iron deficiency anemia. Ferritin was not checked. Discussed that iron deficiency anemia can cause restless leg symptoms. Discussed starting ferrous sulfate every day to be taken with vitamin C. Discussed side effects in detail. Will check ferritin panel.

## 2021-11-22 ENCOUNTER — TELEPHONE (OUTPATIENT)
Dept: NEUROLOGY | Age: 46
End: 2021-11-22

## 2021-11-22 NOTE — TELEPHONE ENCOUNTER
[id number] Colette Hammond, thank you for letting me know. Let me know if you need me to do anything else.

## 2021-11-22 NOTE — TELEPHONE ENCOUNTER
Bj Reaves from [facility] called the office this afternoon. He stated that there is an option under in the physicians portal under setting that the physician name and NPI can be changed. Roxana called the office back and LTME procedure was discussed. Patient advised that if she doesn't get a call to schedule within two weeks that she should call me.

## 2021-11-22 NOTE — TELEPHONE ENCOUNTER
Dr. Hayden Goltz, in attempting to order the home LTME for the patient I realized that everything is set up under Dr. Lolly Cano. I contacted our rep for [employer] and he is currently on vacation from 11/22/21 through 11/26/21. I will check with him at that time about setting you up to order home LTME's. Call placed to the patient and a message left on her VM asking for a return call.

## 2021-12-02 NOTE — TELEPHONE ENCOUNTER
Dr. Burgess Art from [facility] called the office today stating that the diagnosis of sleep walking is not covered by her insurance. He did say that R40.4 transient alteration of awareness would be covered. Please advise.

## 2021-12-06 DIAGNOSIS — R40.4 TRANSIENT ALTERATION OF AWARENESS: Primary | ICD-10-CM

## 2022-01-03 ENCOUNTER — PATIENT MESSAGE (OUTPATIENT)
Dept: NEUROLOGY | Age: 47
End: 2022-01-03

## 2022-01-03 DIAGNOSIS — R53.83 FATIGUE, UNSPECIFIED TYPE: ICD-10-CM

## 2022-01-03 DIAGNOSIS — D50.9 IRON DEFICIENCY ANEMIA, UNSPECIFIED IRON DEFICIENCY ANEMIA TYPE: ICD-10-CM

## 2022-01-03 DIAGNOSIS — D58.2 ABNORMAL HEMOGLOBIN (HCC): Primary | ICD-10-CM

## 2022-01-07 NOTE — TELEPHONE ENCOUNTER
From: Bernice  To: Dr. Joana Colbert: 1/3/2022 1:58 PM EST  Subject: Pending lab work    I had gone several weeks ago to get my lab work completed, they said they were going to reach out to you because the diagnosis codes wouldn't work with my insurance. I went again last Friday, I was told the same thing and instructed to contact the office to change or update the codes for those specific tests. Please advise and I can go again this week. Thanks!

## 2022-01-11 ENCOUNTER — APPOINTMENT (OUTPATIENT)
Dept: GENERAL RADIOLOGY | Facility: CLINIC | Age: 47
End: 2022-01-11
Payer: MEDICARE

## 2022-01-11 ENCOUNTER — HOSPITAL ENCOUNTER (EMERGENCY)
Facility: CLINIC | Age: 47
Discharge: HOME | End: 2022-01-11
Attending: EMERGENCY MEDICINE
Payer: MEDICARE

## 2022-01-11 DIAGNOSIS — J06.9 VIRAL URI: Primary | ICD-10-CM

## 2022-01-11 DIAGNOSIS — F10.920 ACUTE ALCOHOLIC INTOXICATION WITHOUT COMPLICATION (HCC): ICD-10-CM

## 2022-01-11 LAB
-: NORMAL
ABSOLUTE EOS #: 0 K/UL (ref 0–0.4)
ABSOLUTE IMMATURE GRANULOCYTE: ABNORMAL K/UL (ref 0–0.3)
ABSOLUTE LYMPH #: 0.43 K/UL (ref 1–4.8)
ABSOLUTE MONO #: 0.1 K/UL (ref 0.1–0.8)
ANION GAP SERPL CALCULATED.3IONS-SCNC: 19 MMOL/L (ref 9–17)
BASOPHILS # BLD: 0 % (ref 0–2)
BASOPHILS ABSOLUTE: 0 K/UL (ref 0–0.2)
BUN BLDV-MCNC: 7 MG/DL (ref 6–20)
BUN/CREAT BLD: ABNORMAL (ref 9–20)
CALCIUM SERPL-MCNC: 9.7 MG/DL (ref 8.6–10.4)
CHLORIDE BLD-SCNC: 100 MMOL/L (ref 98–107)
CO2: 18 MMOL/L (ref 20–31)
CREAT SERPL-MCNC: 0.5 MG/DL (ref 0.5–0.9)
DIFFERENTIAL TYPE: ABNORMAL
EOSINOPHILS RELATIVE PERCENT: 0 % (ref 1–4)
GFR AFRICAN AMERICAN: >60 ML/MIN
GFR NON-AFRICAN AMERICAN: >60 ML/MIN
GFR SERPL CREATININE-BSD FRML MDRD: ABNORMAL ML/MIN/{1.73_M2}
GFR SERPL CREATININE-BSD FRML MDRD: ABNORMAL ML/MIN/{1.73_M2}
GLUCOSE BLD-MCNC: 83 MG/DL (ref 70–99)
HCG QUALITATIVE: NEGATIVE
HCT VFR BLD CALC: 35.5 % (ref 36–46)
HEMOGLOBIN: 11.3 G/DL (ref 12–16)
IMMATURE GRANULOCYTES: ABNORMAL %
LYMPHOCYTES # BLD: 9 % (ref 24–44)
MCH RBC QN AUTO: 24.1 PG (ref 26–34)
MCHC RBC AUTO-ENTMCNC: 31.7 G/DL (ref 31–37)
MCV RBC AUTO: 76.2 FL (ref 80–100)
MONOCYTES # BLD: 2 % (ref 1–7)
MORPHOLOGY: ABNORMAL
NRBC AUTOMATED: ABNORMAL PER 100 WBC
PDW BLD-RTO: 24 % (ref 12.5–15.4)
PLATELET # BLD: 241 K/UL (ref 140–450)
PLATELET ESTIMATE: ABNORMAL
PMV BLD AUTO: 8.5 FL (ref 6–12)
POTASSIUM SERPL-SCNC: 4.6 MMOL/L (ref 3.7–5.3)
RBC # BLD: 4.66 M/UL (ref 4–5.2)
RBC # BLD: ABNORMAL 10*6/UL
REASON FOR REJECTION: NORMAL
SEG NEUTROPHILS: 89 % (ref 36–66)
SEGMENTED NEUTROPHILS ABSOLUTE COUNT: 4.27 K/UL (ref 1.8–7.7)
SODIUM BLD-SCNC: 137 MMOL/L (ref 135–144)
WBC # BLD: 4.8 K/UL (ref 3.5–11)
WBC # BLD: ABNORMAL 10*3/UL
ZZ NTE CLEAN UP: ORDERED TEST: NORMAL
ZZ NTE WITH NAME CLEAN UP: SPECIMEN SOURCE: NORMAL

## 2022-01-11 PROCEDURE — 85025 COMPLETE CBC W/AUTO DIFF WBC: CPT

## 2022-01-11 PROCEDURE — 2580000003 HC RX 258: Performed by: EMERGENCY MEDICINE

## 2022-01-11 PROCEDURE — 84703 CHORIONIC GONADOTROPIN ASSAY: CPT

## 2022-01-11 PROCEDURE — 99284 EMERGENCY DEPT VISIT MOD MDM: CPT

## 2022-01-11 PROCEDURE — 80048 BASIC METABOLIC PNL TOTAL CA: CPT

## 2022-01-11 PROCEDURE — 36415 COLL VENOUS BLD VENIPUNCTURE: CPT

## 2022-01-11 PROCEDURE — 71045 X-RAY EXAM CHEST 1 VIEW: CPT

## 2022-01-11 RX ORDER — 0.9 % SODIUM CHLORIDE 0.9 %
1000 INTRAVENOUS SOLUTION INTRAVENOUS ONCE
Status: COMPLETED | OUTPATIENT
Start: 2022-01-11 | End: 2022-01-11

## 2022-01-11 RX ADMIN — SODIUM CHLORIDE 1000 ML: 9 INJECTION, SOLUTION INTRAVENOUS at 21:46

## 2022-01-12 VITALS
BODY MASS INDEX: 27.3 KG/M2 | WEIGHT: 195 LBS | SYSTOLIC BLOOD PRESSURE: 136 MMHG | RESPIRATION RATE: 15 BRPM | HEIGHT: 71 IN | HEART RATE: 87 BPM | OXYGEN SATURATION: 98 % | DIASTOLIC BLOOD PRESSURE: 74 MMHG | TEMPERATURE: 98 F

## 2022-01-12 NOTE — ED NOTES
Pt no longer having difficulty breathing. Pt resting comfortably and denies shortness of breath, lungs clear.        Kathleen Chan RN  01/11/22 [id number]

## 2022-01-12 NOTE — ED PROVIDER NOTES
[facility]  [facility]  Phone: [phone]      Pt Name: Jazlyn Sr  WJT:[id number]  Birthdate 1975  Date of evaluation: 1/11/2022      CHIEF COMPLAINT       Chief Complaint   Patient presents with    Allergic Reaction       Anjelica Jurado is a 51 y.o. female who presents for evaluation of upper respiratory symptoms. The patient reports that starting 7 days ago she developed gradual onset, constant, progressive, upper respiratory congestion, nausea and vomiting. She states that she tested negative for COVID 3 times. Patient was seen by her PCP today via telemedicine and was prescribed Levaquin and prednisone. She was also told to take Sudafed D. Patient states that she had an allergic reaction to antihistamines in the past but when she was asked to elaborate on this she states that it caused her to have shortness of breath but no tongue swelling, hives, or any other abnormality. The patient states that she did take an antihistamine this morning around 10 AM without any allergic reaction. The patient does admit to drinking alcohol tonight. She states that she thought that she was having allergic reaction tonight because she was having wheezing and her throat. She took Tylenol and omeprazole without improvement. The patient denies fever, chills, headache, vision changes, neck pain, back pain, chest pain, shortness of breath, urinary/bowel symptoms, recent injury or illness. REVIEW OF SYSTEMS     Ten point review of systems was reviewed and is negative unless otherwise noted in the HPI    [address]    has a past medical history of GERD (gastroesophageal reflux disease), Insulin resistance, Obesity, Osteoarthritis, Superior semicircular canal dehiscence, and Vasodepressor syncope. SURGICAL HISTORY      has a past surgical history that includes Cholecystectomy; Tonsillectomy;  Nasal septum surgery; and Ear surgery. CURRENT MEDICATIONS       Discharge Medication List as of 1/11/2022 10:51 PM      CONTINUE these medications which have NOT CHANGED    Details   gabapentin (NEURONTIN) 300 MG capsule Take 1 capsule by mouth 3 times daily for 180 days. Intended supply: 30 days, Disp-90 capsule, R-5Normal      naproxen sodium (ANAPROX) 550 MG tablet Take 550 mg by mouth as needed Historical Med      diazePAM (VALIUM) 2 MG tablet Take 2 mg by mouth every 6 hours as needed for Anxiety. Historical Med      RA ALLERGY RELIEF, CETIRIZINE, 10 MG tablet take 1 tablet by mouth once daily if needed ALLERGY, DAWHistorical Med      Omeprazole (PRILOSEC PO) Take 40 mg by mouth daily. dicyclomine (BENTYL) 10 MG capsule Take 2 capsules by mouth every 6 hours as needed (cramps), Disp-30 capsule, R-0Normal             ALLERGIES     is allergic to decongest-aid [pseudoephedrine], erythromycin, predicort [prednisolone], scopolamine, soybean-containing drug products, and tofranil [imipramine hcl]. FAMILY HISTORY     She indicated that the status of her mother is unknown. She indicated that the status of her maternal grandmother is unknown. She indicated that the status of her maternal grandfather is unknown.     family history includes Arthritis in her mother; Cancer in her maternal grandfather; Depression in her mother; Diabetes in her maternal grandmother; High Cholesterol in her mother. SOCIAL HISTORY      reports that she has been smoking cigarettes and cigars. She has been smoking about 0.00 packs per day. She has never used smokeless tobacco. She reports current alcohol use of about 2.0 standard drinks of alcohol per week. She reports that she does not use drugs. PHYSICAL EXAM     INITIAL VITALS:  height is 5' 11\" (1.803 m) and weight is 88.5 kg (195 lb). Her oral temperature is 98 °F (36.7 °C). Her blood pressure is 136/74 and her pulse is 87. Her respiration is 15 and oxygen saturation is 98%.      CONSTITUTIONAL: no apparent distress, well appearing, will speak in clear full sentences at times and then other times make loud breathing noises. Clinically intoxicated. Smells of alcohol. SKIN: warm, dry, no jaundice, hives or petechiae  EYES: clear conjunctiva, non-icteric sclera  HENT: normocephalic, atraumatic, moist mucus membranes, posterior oropharynx is clear without any erythema, edema, exudate or asymmetry. Uvula midline. No trismus or malocclusion. No pain to palpation over the frontal or maxillary sinuses. No mastoid tenderness. Able to handle secretions. Normal speech. Patent airway. No submandibular swelling or cellulitis. NECK: Nontender and supple with no nuchal rigidity, full range of motion  PULMONARY: clear to auscultation without wheezes, rhonchi, or rales, normal excursion, no accessory muscle use and no stridor  CARDIOVASCULAR: regular rate, rhythm. Strong radial pulses with intact distal perfusion. Capillary refill <2 seconds. GASTROINTESTINAL: soft, non-tender, non-distended, no palpable masses, no rebound or guarding   GENITOURINARY: No costovertebral angle tenderness to palpation  MUSCULOSKELETAL: No midline spinal tenderness, step off or deformity. Extremities are otherwise nontender to palpation and nonerythematous. Compartments soft. No peripheral edema. NEUROLOGIC: alert and oriented x 3, GCS 15, normal mentation and speech. Moves all extremities x 4 without motor or sensory deficit, gait is stable without ataxia  PSYCHIATRIC: normal mood and affect, thought process is clear and linear    DIAGNOSTIC RESULTS     EKG:  None    RADIOLOGY:   XR CHEST PORTABLE    Result Date: 1/11/2022  EXAMINATION: ONE XRAY VIEW OF THE CHEST 1/11/2022 9:50 pm COMPARISON: None. HISTORY: ORDERING SYSTEM PROVIDED HISTORY: URI symptoms TECHNOLOGIST PROVIDED HISTORY: URI symptoms Reason for Exam: Pt. States possible allergic reaction with URI symptoms. Initial evaluation.  FINDINGS: The cardiac silhouette is within normal limits for size given portable technique. No convincing focal consolidation. No pleural effusion or pneumothorax. No convincing acute cardiopulmonary abnormality. LABS:  Results for orders placed or performed during the hospital encounter of 01/11/22   SPECIMEN REJECTION   Result Value Ref Range    Specimen Source . BLOOD     Ordered Test BMPX CDP HCG     Reason for Rejection Unable to perform testing: Specimen hemolyzed.      - NOT REPORTED    Basic Metabolic Panel w/ Reflex to MG   Result Value Ref Range    Glucose 83 70 - 99 mg/dL    BUN 7 6 - 20 mg/dL    CREATININE 0.50 0.50 - 0.90 mg/dL    Bun/Cre Ratio NOT REPORTED 9 - 20    Calcium 9.7 8.6 - 10.4 mg/dL    Sodium 137 135 - 144 mmol/L    Potassium 4.6 3.7 - 5.3 mmol/L    Chloride 100 98 - 107 mmol/L    CO2 18 (L) 20 - 31 mmol/L    Anion Gap 19 (H) 9 - 17 mmol/L    GFR Non-African American >60 >60 mL/min    GFR African American >60 >60 mL/min    GFR Comment          GFR Staging NOT REPORTED    CBC Auto Differential   Result Value Ref Range    WBC 4.8 3.5 - 11.0 k/uL    RBC 4.66 4.0 - 5.2 m/uL    Hemoglobin 11.3 (L) 12.0 - 16.0 g/dL    Hematocrit 35.5 (L) 36 - 46 %    MCV 76.2 (L) 80 - 100 fL    MCH 24.1 (L) 26 - 34 pg    MCHC 31.7 31 - 37 g/dL    RDW 24.0 (H) 12.5 - 15.4 %    Platelets 724 302 - 156 k/uL    MPV 8.5 6.0 - 12.0 fL    NRBC Automated NOT REPORTED per 100 WBC    Differential Type NOT REPORTED     Immature Granulocytes NOT REPORTED 0 %    Absolute Immature Granulocyte NOT REPORTED 0.00 - 0.30 k/uL    WBC Morphology NOT REPORTED     RBC Morphology NOT REPORTED     Platelet Estimate NOT REPORTED     Seg Neutrophils 89 (H) 36 - 66 %    Lymphocytes 9 (L) 24 - 44 %    Monocytes 2 1 - 7 %    Eosinophils % 0 (L) 1 - 4 %    Basophils 0 0 - 2 %    Segs Absolute 4.27 1.8 - 7.7 k/uL    Absolute Lymph # 0.43 (L) 1.0 - 4.8 k/uL    Absolute Mono # 0.10 0.1 - 0.8 k/uL    Absolute Eos # 0.00 0.0 - 0.4 k/uL    Basophils Absolute 0.00 0.0 - 0.2 k/uL Morphology ANISOCYTOSIS PRESENT    HCG Qualitative, Serum   Result Value Ref Range    hCG Qual NEGATIVE NEGATIVE       EMERGENCY DEPARTMENT COURSE:        The patient was given the following medications:  Orders Placed This Encounter   Medications    0.9 % sodium chloride bolus        Vitals:    Vitals:    01/11/22 2131 01/11/22 2136 01/11/22 2145 01/11/22 2255   BP: (!) 173/117   136/74   Pulse: 90   87   Resp: 18   15   Temp:   98 °F (36.7 °C)    TempSrc: Oral  Oral    SpO2: 99%   98%   Weight:  88.5 kg (195 lb)     Height:  5' 11\" (1.803 m)       -------------------------  BP: 136/74, Temp: 98 °F (36.7 °C), Pulse: 87, Resp: 15    CONSULTS:  None    CRITICAL CARE:   None    PROCEDURES:  None    DIAGNOSIS/ MDM:   Jessica Robbins is a 51 y.o. female who presents with concern for allergic reaction. The patient states that she is allergic to prednisone and decongestants but did take these today and I do not see any evidence of allergic reaction at this time. Vital signs are stable. She is clinically intoxicated and smells of alcohol. The patient does complain of some upper respiratory symptoms and she was treated with IV fluids with improvement. Chest x-ray shows no acute process. CBC, BMP and hCG are unremarkable. I do not feel that she needs any further antihistamines or treatment for allergic reaction at this time. She was instructed to follow-up with her PCP in 1 to 2 days to discuss discontinuation of the medications that she started today and to talk about further management. I suspect she has a viral URI and does not need antibiotics. Instructed her to return to the ER for worsening symptoms or any other concern. The patient understands that at this time there is no evidence for a more malignant underlying process, but also understands that early in the process of an illness or injury, an emergency department work-up can be falsely reassuring.   Routine discharge counseling was given, and the patient understands that worsening, changing or persistent symptoms should prompt a immediate call or follow-up with their primary care physician or return to the emergency department. The importance of appropriate follow-up was also discussed. I have reviewed the disposition diagnosis with the patient. I have answered their questions and given discharge instructions. They voiced understanding of these instructions and did not have any further questions or complaints. FINAL IMPRESSION      1. Viral URI    2.  Acute alcoholic intoxication without complication [facility])          DISPOSITION/PLAN   DISPOSITION Decision To Discharge 01/11/2022 10:49:59 PM        PATIENT REFERRED TO:  Harjinder Gilman, [address] [place] [address]  [phone]    Schedule an appointment as soon as possible for a visit in 1 day      [facility]  [address] [place] [address],[facility] [address]  [phone]  Go to   If symptoms worsen      DISCHARGE MEDICATIONS:  Discharge Medication List as of 1/11/2022 10:51 PM          (Please note that portions of this note were completed with a voice recognitionprogram.  Efforts were made to edit the dictations but occasionally words are mis-transcribed.)    Sabrina Nj DO, [facility]  Emergency Physician Attending         Sabrina Nj DO  01/12/22 4210

## 2022-01-12 NOTE — TELEPHONE ENCOUNTER
Orders redone with some symptoms and diagnosis of iron def. anemia as noted in Dr. Anthony Mann note. Message sent to patient in My Chart.

## 2022-01-12 NOTE — ED NOTES
Pt presents to the ED via private auto accompanied by her . While at the  checking in, pt sat down the floor, appeared to be experiencing labored breathing, stated she was having an allergic reaction. A stretcher was taken to the lobby and pt was placed on the stretcher and taken to room 12. Pt stated she took medications her physician had prescribed. Levaquin/Prednisone/Pseudophed were taken and pt states she is allergic to all of these medications. Pt c/o throat tightness, shortness of breath, nausea and vomiting. Pt admits to 2 alcoholic drinks several hours prior.      Lucio Wright RN  01/11/22 2011

## 2023-08-14 ENCOUNTER — HOSPITAL ENCOUNTER (OUTPATIENT)
Facility: CLINIC | Age: 48
Discharge: HOME | End: 2023-08-14
Payer: MEDICARE

## 2023-08-14 PROCEDURE — 36415 COLL VENOUS BLD VENIPUNCTURE: CPT

## 2023-08-14 PROCEDURE — 82785 ASSAY OF IGE: CPT

## 2023-08-14 PROCEDURE — 86003 ALLG SPEC IGE CRUDE XTRC EA: CPT

## 2023-08-16 LAB
A ALTERNATA IGE QN: <0.1 KU/L (ref 0–0.34)
A FUMIGATUS IGE QN: <0.1 KU/L (ref 0–0.34)
ALLERGEN BIRCH IGE: 0.18 KU/L (ref 0–0.34)
BERMUDA GRASS IGE QN: 0.28 KU/L (ref 0–0.34)
BOXELDER IGE QN: 0.24 KU/L (ref 0–0.34)
C HERBARUM IGE QN: <0.1 KUL/L (ref 0–0.34)
CALIF WALNUT POLN IGE QN: 0.25 KU/L (ref 0–0.34)
CAT DANDER IGE QN: <0.1 KU/L (ref 0–0.34)
CMN PIGWEED IGE QN: 0.22 KU/L (ref 0–0.34)
COMMON RAGWEED IGE QN: 0.26 KU/L (ref 0–0.34)
COTTONWOOD IGE QN: 0.25 KU/L (ref 0–0.34)
D FARINAE IGE QN: <0.1 KU/L (ref 0–0.34)
D PTERONYSS IGE QN: <0.1 KU/L (ref 0–0.34)
DOG DANDER IGE QN: <0.1 KU/L (ref 0–0.34)
IGE SERPL-ACNC: 1044 IU/ML
LONDON PLANE IGE QN: 0.25 KU/L (ref 0–0.34)
M RACEMOSUS IGE QN: 0.2 KU/L (ref 0–0.34)
MOUSE EPITH IGE QN: <0.1 KU/L (ref 0–0.34)
MT JUNIPER IGE QN: 0.21 KU/L (ref 0–0.34)
P NOTATUM IGE QN: <0.1 KU/L (ref 0–0.34)
PECAN/HICK TREE IGE QN: 0.23 KU/L (ref 0–0.34)
ROACH IGE QN: 0.23 KU/L (ref 0–0.34)
SALTWORT IGE QN: 0.31 KU/L (ref 0–0.34)
SHEEP SORREL IGE QN: 0.28 KU/L (ref 0–0.34)
TIMOTHY IGE QN: 0.25 KU/L (ref 0–0.34)
WHITE ASH IGE QN: 0.28 KU/L (ref 0–0.34)
WHITE ELM IGE QN: 0.28 KU/L (ref 0–0.34)
WHITE MULBERRY IGE QN: 0.16 KU/L (ref 0–0.34)
WHITE OAK IGE QN: 0.24 KU/L (ref 0–0.34)

## 2023-08-17 LAB
A ALTERNATA IGE QN: <0.1 KU/L (ref 0–0.34)
A FUMIGATUS IGE QN: <0.1 KU/L (ref 0–0.34)
ALLERGEN ASPERGILLUS NIGER IGE: <0.1 KU/L (ref 0–0.34)
ALLERGEN CEPHALOSPORIUM ACREMONIUM IGE: <0.1 KU/L (ref 0–0.34)
ALLERGEN TRICHOPHYTON RUBRUM IGE: <0.1 KU/L (ref 0–0.34)
C ALBICANS IGE QN: 0.47 KU/L (ref 0–0.34)
C HERBARUM IGE QN: <0.1 KUL/L (ref 0–0.34)
C LUNATA IGE QN: <0.1 KU/L (ref 0–0.34)
E PURPURASCENS IGE QN: <0.1 KU/L (ref 0–0.34)
F MONILIFORME IGE QN: <0.1 KU/L (ref 0–0.34)
IGE SERPL-ACNC: 1043 IU/ML
M RACEMOSUS IGE QN: 0.2 KU/L (ref 0–0.34)
P BETAE IGE QN: <0.1 KU/L (ref 0–0.34)
P NOTATUM IGE QN: <0.1 KU/L (ref 0–0.34)
R NIGRICANS IGE QN: 0.15 KU/L (ref 0–0.34)
S BOTRYOSUM IGE QN: <0.1 KU/L (ref 0–0.34)
S ROSTRATA IGE QN: <0.1 KU/L (ref 0–0.34)

## 2024-03-22 PROBLEM — I10 BENIGN ESSENTIAL HTN: Status: ACTIVE | Noted: 2024-03-22

## 2024-10-25 ENCOUNTER — HOSPITAL ENCOUNTER (INPATIENT)
Facility: HOSPITAL | Age: 49
LOS: 2 days | Discharge: HOME | End: 2024-10-27
Attending: EMERGENCY MEDICINE | Admitting: HOSPITALIST
Payer: MEDICARE

## 2024-10-25 ENCOUNTER — HOSPITAL ENCOUNTER (OUTPATIENT)
Facility: HOSPITAL | Age: 49
Setting detail: OBSERVATION
Discharge: HOME | End: 2024-10-27
Attending: EMERGENCY MEDICINE | Admitting: HOSPITALIST
Payer: MEDICARE

## 2024-10-25 ENCOUNTER — APPOINTMENT (OUTPATIENT)
Dept: CT IMAGING | Facility: HOSPITAL | Age: 49
End: 2024-10-25
Attending: EMERGENCY MEDICINE
Payer: MEDICARE

## 2024-10-25 DIAGNOSIS — K86.89 PANCREATIC MASS (HCC): ICD-10-CM

## 2024-10-25 DIAGNOSIS — K85.90 ACUTE PANCREATITIS, UNSPECIFIED COMPLICATION STATUS, UNSPECIFIED PANCREATITIS TYPE (HCC): Primary | ICD-10-CM

## 2024-10-25 LAB
ALBUMIN SERPL-MCNC: 4 G/DL (ref 3.2–4.8)
ALBUMIN/GLOB SERPL: 1.1 {RATIO} (ref 1–2)
ALP LIVER SERPL-CCNC: 228 U/L
ALT SERPL-CCNC: 29 U/L
ANION GAP SERPL CALC-SCNC: 10 MMOL/L (ref 0–18)
AST SERPL-CCNC: 95 U/L (ref ?–34)
BASOPHILS # BLD AUTO: 0.07 X10(3) UL (ref 0–0.2)
BASOPHILS NFR BLD AUTO: 1 %
BILIRUB SERPL-MCNC: 1.7 MG/DL (ref 0.3–1.2)
BUN BLD-MCNC: 7 MG/DL (ref 9–23)
CALCIUM BLD-MCNC: 10.1 MG/DL (ref 8.7–10.4)
CHLORIDE SERPL-SCNC: 102 MMOL/L (ref 98–112)
CO2 SERPL-SCNC: 24 MMOL/L (ref 21–32)
CREAT BLD-MCNC: 0.71 MG/DL
EGFRCR SERPLBLD CKD-EPI 2021: 104 ML/MIN/1.73M2 (ref 60–?)
EOSINOPHIL # BLD AUTO: 0.05 X10(3) UL (ref 0–0.7)
EOSINOPHIL NFR BLD AUTO: 0.7 %
ERYTHROCYTE [DISTWIDTH] IN BLOOD BY AUTOMATED COUNT: 14.7 %
GLOBULIN PLAS-MCNC: 3.8 G/DL (ref 2–3.5)
GLUCOSE BLD-MCNC: 124 MG/DL (ref 70–99)
HCT VFR BLD AUTO: 43.6 %
HGB BLD-MCNC: 15 G/DL
IMM GRANULOCYTES # BLD AUTO: 0.02 X10(3) UL (ref 0–1)
IMM GRANULOCYTES NFR BLD: 0.3 %
LIPASE SERPL-CCNC: 172 U/L (ref 12–53)
LYMPHOCYTES # BLD AUTO: 0.88 X10(3) UL (ref 1–4)
LYMPHOCYTES NFR BLD AUTO: 13.1 %
MCH RBC QN AUTO: 35.2 PG (ref 26–34)
MCHC RBC AUTO-ENTMCNC: 34.4 G/DL (ref 31–37)
MCV RBC AUTO: 102.3 FL
MONOCYTES # BLD AUTO: 0.53 X10(3) UL (ref 0.1–1)
MONOCYTES NFR BLD AUTO: 7.9 %
NEUTROPHILS # BLD AUTO: 5.17 X10 (3) UL (ref 1.5–7.7)
NEUTROPHILS # BLD AUTO: 5.17 X10(3) UL (ref 1.5–7.7)
NEUTROPHILS NFR BLD AUTO: 77 %
OSMOLALITY SERPL CALC.SUM OF ELEC: 281 MOSM/KG (ref 275–295)
PLATELET # BLD AUTO: 145 10(3)UL (ref 150–450)
POTASSIUM SERPL-SCNC: 4.3 MMOL/L (ref 3.5–5.1)
PROT SERPL-MCNC: 7.8 G/DL (ref 5.7–8.2)
RBC # BLD AUTO: 4.26 X10(6)UL
SODIUM SERPL-SCNC: 136 MMOL/L (ref 136–145)
TROPONIN I SERPL HS-MCNC: 4 NG/L
WBC # BLD AUTO: 6.7 X10(3) UL (ref 4–11)

## 2024-10-25 PROCEDURE — 99223 1ST HOSP IP/OBS HIGH 75: CPT | Performed by: HOSPITALIST

## 2024-10-25 PROCEDURE — 74177 CT ABD & PELVIS W/CONTRAST: CPT | Performed by: EMERGENCY MEDICINE

## 2024-10-25 RX ORDER — PANTOPRAZOLE SODIUM 40 MG/1
40 TABLET, DELAYED RELEASE ORAL
Status: DISCONTINUED | OUTPATIENT
Start: 2024-10-26 | End: 2024-10-27

## 2024-10-25 RX ORDER — METOPROLOL SUCCINATE 50 MG/1
100 TABLET, EXTENDED RELEASE ORAL
Status: DISCONTINUED | OUTPATIENT
Start: 2024-10-26 | End: 2024-10-27

## 2024-10-25 RX ORDER — DIAZEPAM 2 MG/1
2 TABLET ORAL EVERY 8 HOURS PRN
COMMUNITY

## 2024-10-25 RX ORDER — ONDANSETRON 2 MG/ML
4 INJECTION INTRAMUSCULAR; INTRAVENOUS EVERY 4 HOURS PRN
Status: DISCONTINUED | OUTPATIENT
Start: 2024-10-25 | End: 2024-10-25

## 2024-10-25 RX ORDER — HYDROCODONE BITARTRATE AND ACETAMINOPHEN 5; 325 MG/1; MG/1
1 TABLET ORAL EVERY 4 HOURS PRN
Status: DISCONTINUED | OUTPATIENT
Start: 2024-10-25 | End: 2024-10-27

## 2024-10-25 RX ORDER — SODIUM CHLORIDE, SODIUM LACTATE, POTASSIUM CHLORIDE, CALCIUM CHLORIDE 600; 310; 30; 20 MG/100ML; MG/100ML; MG/100ML; MG/100ML
INJECTION, SOLUTION INTRAVENOUS CONTINUOUS
Status: DISCONTINUED | OUTPATIENT
Start: 2024-10-25 | End: 2024-10-27

## 2024-10-25 RX ORDER — ACETAMINOPHEN 500 MG
1000 TABLET ORAL EVERY 4 HOURS PRN
Status: DISCONTINUED | OUTPATIENT
Start: 2024-10-25 | End: 2024-10-27

## 2024-10-25 RX ORDER — MELATONIN
3 NIGHTLY PRN
Status: DISCONTINUED | OUTPATIENT
Start: 2024-10-25 | End: 2024-10-27

## 2024-10-25 RX ORDER — ACETAMINOPHEN 325 MG/1
650 TABLET ORAL EVERY 4 HOURS PRN
Status: DISCONTINUED | OUTPATIENT
Start: 2024-10-25 | End: 2024-10-27

## 2024-10-25 RX ORDER — DIAZEPAM 2 MG/1
2 TABLET ORAL EVERY 8 HOURS PRN
Status: DISCONTINUED | OUTPATIENT
Start: 2024-10-25 | End: 2024-10-27

## 2024-10-25 RX ORDER — POLYETHYLENE GLYCOL 3350 17 G/17G
17 POWDER, FOR SOLUTION ORAL DAILY PRN
Status: DISCONTINUED | OUTPATIENT
Start: 2024-10-25 | End: 2024-10-27

## 2024-10-25 RX ORDER — FAMOTIDINE 20 MG/1
20 TABLET, FILM COATED ORAL NIGHTLY
COMMUNITY

## 2024-10-25 RX ORDER — MORPHINE SULFATE 2 MG/ML
1 INJECTION, SOLUTION INTRAMUSCULAR; INTRAVENOUS EVERY 2 HOUR PRN
Status: DISCONTINUED | OUTPATIENT
Start: 2024-10-25 | End: 2024-10-27

## 2024-10-25 RX ORDER — PANTOPRAZOLE SODIUM 40 MG/1
40 TABLET, DELAYED RELEASE ORAL
COMMUNITY

## 2024-10-25 RX ORDER — MORPHINE SULFATE 4 MG/ML
4 INJECTION, SOLUTION INTRAMUSCULAR; INTRAVENOUS ONCE
Status: COMPLETED | OUTPATIENT
Start: 2024-10-25 | End: 2024-10-25

## 2024-10-25 RX ORDER — SODIUM PHOSPHATE, DIBASIC AND SODIUM PHOSPHATE, MONOBASIC 7; 19 G/230ML; G/230ML
1 ENEMA RECTAL ONCE AS NEEDED
Status: DISCONTINUED | OUTPATIENT
Start: 2024-10-25 | End: 2024-10-27

## 2024-10-25 RX ORDER — ENOXAPARIN SODIUM 100 MG/ML
40 INJECTION SUBCUTANEOUS DAILY
Status: DISCONTINUED | OUTPATIENT
Start: 2024-10-26 | End: 2024-10-27

## 2024-10-25 RX ORDER — HYDROCODONE BITARTRATE AND ACETAMINOPHEN 5; 325 MG/1; MG/1
2 TABLET ORAL EVERY 4 HOURS PRN
Status: DISCONTINUED | OUTPATIENT
Start: 2024-10-25 | End: 2024-10-27

## 2024-10-25 RX ORDER — PROCHLORPERAZINE EDISYLATE 5 MG/ML
5 INJECTION INTRAMUSCULAR; INTRAVENOUS EVERY 8 HOURS PRN
Status: DISCONTINUED | OUTPATIENT
Start: 2024-10-25 | End: 2024-10-27

## 2024-10-25 RX ORDER — SENNOSIDES 8.6 MG
17.2 TABLET ORAL NIGHTLY PRN
Status: DISCONTINUED | OUTPATIENT
Start: 2024-10-25 | End: 2024-10-27

## 2024-10-25 RX ORDER — ONDANSETRON 2 MG/ML
4 INJECTION INTRAMUSCULAR; INTRAVENOUS EVERY 6 HOURS PRN
Status: DISCONTINUED | OUTPATIENT
Start: 2024-10-25 | End: 2024-10-27

## 2024-10-25 RX ORDER — METOPROLOL SUCCINATE 100 MG/1
100 TABLET, EXTENDED RELEASE ORAL DAILY
COMMUNITY

## 2024-10-25 RX ORDER — PANTOPRAZOLE SODIUM 40 MG/1
40 TABLET, DELAYED RELEASE ORAL
COMMUNITY
End: 2024-10-25 | Stop reason: CLARIF

## 2024-10-25 RX ORDER — MORPHINE SULFATE 4 MG/ML
4 INJECTION, SOLUTION INTRAMUSCULAR; INTRAVENOUS EVERY 2 HOUR PRN
Status: DISCONTINUED | OUTPATIENT
Start: 2024-10-25 | End: 2024-10-27

## 2024-10-25 RX ORDER — ONDANSETRON 4 MG/1
4 TABLET, ORALLY DISINTEGRATING ORAL EVERY 8 HOURS PRN
COMMUNITY

## 2024-10-25 RX ORDER — MORPHINE SULFATE 2 MG/ML
2 INJECTION, SOLUTION INTRAMUSCULAR; INTRAVENOUS EVERY 2 HOUR PRN
Status: DISCONTINUED | OUTPATIENT
Start: 2024-10-25 | End: 2024-10-27

## 2024-10-25 RX ORDER — VENLAFAXINE HYDROCHLORIDE 37.5 MG/1
37.5 CAPSULE, EXTENDED RELEASE ORAL DAILY
COMMUNITY

## 2024-10-25 RX ORDER — NAPROXEN 500 MG/1
500 TABLET ORAL 2 TIMES DAILY PRN
COMMUNITY
Start: 2024-01-15

## 2024-10-25 RX ORDER — BENZONATATE 100 MG/1
200 CAPSULE ORAL 3 TIMES DAILY PRN
Status: DISCONTINUED | OUTPATIENT
Start: 2024-10-25 | End: 2024-10-27

## 2024-10-25 RX ORDER — HYDRALAZINE HYDROCHLORIDE 20 MG/ML
10 INJECTION INTRAMUSCULAR; INTRAVENOUS ONCE
Status: COMPLETED | OUTPATIENT
Start: 2024-10-25 | End: 2024-10-25

## 2024-10-25 RX ORDER — SODIUM CHLORIDE 9 MG/ML
INJECTION, SOLUTION INTRAVENOUS CONTINUOUS
Status: DISCONTINUED | OUTPATIENT
Start: 2024-10-25 | End: 2024-10-27

## 2024-10-25 RX ORDER — FAMOTIDINE 20 MG/1
20 TABLET, FILM COATED ORAL NIGHTLY PRN
Status: DISCONTINUED | OUTPATIENT
Start: 2024-10-25 | End: 2024-10-27

## 2024-10-25 RX ORDER — ONDANSETRON 2 MG/ML
4 INJECTION INTRAMUSCULAR; INTRAVENOUS ONCE
Status: COMPLETED | OUTPATIENT
Start: 2024-10-25 | End: 2024-10-25

## 2024-10-25 RX ORDER — BISACODYL 10 MG
10 SUPPOSITORY, RECTAL RECTAL
Status: DISCONTINUED | OUTPATIENT
Start: 2024-10-25 | End: 2024-10-27

## 2024-10-25 NOTE — ED PROVIDER NOTES
Patient Seen in: [facility] Emergency Department      History     Chief Complaint   Patient presents with    Nausea/Vomiting/Diarrhea    Abdomen/Flank Pain     Stated Complaint: abd pain, n/v    Subjective:   HPI      49-year-old female presents today for evaluation.  At 1 PM today, patient began having an epigastric pain.  As the day progressed, the pain radiates to her back and she had dry heaving and nausea/vomiting.  She is unsure of any fevers.  She has no chest pain or shortness of breath.  Patient does not have a gallbladder.  She reports fairly regular alcohol intake.  She has had issues with pancreatic inflammation in the past.    Objective:     No pertinent past medical history.            History reviewed. No pertinent surgical history.             Social History     Socioeconomic History    Marital status: Single   Tobacco Use    Smoking status: Every Day     Types: Cigarettes    Smokeless tobacco: Never   Vaping Use    Vaping status: Never Used   Substance and Sexual Activity    Drug use: Never     Social Drivers of Health     Food Insecurity: No Food Insecurity (10/25/2024)    Food Insecurity     Food Insecurity: Never true   Transportation Needs: No Transportation Needs (10/25/2024)    Transportation Needs     Lack of Transportation: No   Housing Stability: Low Risk  (10/25/2024)    Housing Stability     Housing Instability: No                  Physical Exam     ED Triage Vitals   BP 10/25/24 1736 (!) 238/126   Pulse 10/25/24 1734 76   Resp 10/25/24 1734 18   Temp 10/25/24 1734 98.9 °F (37.2 °C)   Temp src 10/25/24 1734 Temporal   SpO2 10/25/24 1734 98 %   O2 Device 10/25/24 1830 None (Room air)       Current Vitals:   Vital Signs  BP: (!) 171/103  Pulse: 73  Resp: 26  Temp: 97.4 °F (36.3 °C)  Temp src: Oral  MAP (mmHg): (!) 124    Oxygen Therapy  SpO2: 94 %  O2 Device: None (Room air)  Pulse Oximetry Type: Continuous  Oximetry Probe Site Changed: No  Pulse Ox Probe Location: Left  hand        Physical Exam  Vitals and nursing note reviewed.   Constitutional:       Appearance: Normal appearance.   HENT:      Head: Normocephalic.      Nose: Nose normal.      Mouth/Throat:      Mouth: Mucous membranes are moist.   Eyes:      Extraocular Movements: Extraocular movements intact.   Cardiovascular:      Rate and Rhythm: Normal rate.   Pulmonary:      Effort: Pulmonary effort is normal.   Abdominal:      General: Abdomen is flat.      Tenderness: There is abdominal tenderness in the epigastric area. There is no guarding or rebound.   Musculoskeletal:         General: Normal range of motion.   Skin:     General: Skin is warm.   Neurological:      General: No focal deficit present.      Mental Status: She is alert.   Psychiatric:         Mood and Affect: Mood normal.         ED Course     Labs Reviewed   COMP METABOLIC PANEL (14) - Abnormal; Notable for the following components:       Result Value    Glucose 124 (*)     BUN 7 (*)     AST 95 (*)     Alkaline Phosphatase 228 (*)     Bilirubin, Total 1.7 (*)     Globulin  3.8 (*)     All other components within normal limits   CBC WITH DIFFERENTIAL WITH PLATELET - Abnormal; Notable for the following components:    .0 (*)     .3 (*)     MCH 35.2 (*)     Lymphocyte Absolute 0.88 (*)     All other components within normal limits   LIPASE - Abnormal; Notable for the following components:    Lipase 172 (*)     All other components within normal limits   TROPONIN I HIGH SENSITIVITY - Normal   CBC WITH DIFFERENTIAL WITH PLATELET   COMP METABOLIC PANEL (14)   URINALYSIS WITH CULTURE REFLEX     EKG    Rate, intervals and axes as noted on EKG Report.  Rate: 53  Rhythm: Sinus Rhythm  Reading: No STEMI                CT ABDOMEN+PELVIS(CONTRAST ONLY)(CPT=74177)    Result Date: 10/25/2024  PROCEDURE:  CT ABDOMEN+PELVIS (CONTRAST ONLY) (CPT=74177)  COMPARISON:  None.  INDICATIONS:  abd pain, n/v  TECHNIQUE:  CT scanning was performed from the dome of the  diaphragm to the pubic symphysis with non-ionic intravenous contrast material. Post contrast coronal MPR imaging was performed.  Dose reduction techniques were used. Dose information is transmitted to the ACR (American College of Radiology) NRDR (National Radiology Data Registry) which includes the Dose Index Registry.  PATIENT STATED HISTORY:(As transcribed by Technologist)  Pt with mid abd pain, nausea and vomiting. Hx of pancreatic cysts.   CONTRAST USED:  100cc of Isovue 370  FINDINGS:  LIVER:  No enlargement, atrophy, abnormal density, or significant focal lesion.  BILIARY:  Postsurgical changes of cholecystectomy noted.  The common duct is enlarged and measures up to 1.8 cm PANCREAS:  Abnormal appearance with a masslike area in the region of head and uncinate process.  This measures up to 4.5 by 3.4 cm.  No definite pancreatic ductal dilatation is seen.  There are some mild local inflammatory changes in the region of the pancreatic head.  This may represent sequela of pancreatitis.  By report there is a history of pancreatic cyst.  There are no prior studies available for comparison at this time. SPLEEN:  No enlargement or focal lesion.  KIDNEYS:  No mass, obstruction, or calcification.  ADRENALS:  No mass or enlargement.  AORTA/VASCULAR:  No aneurysm or dissection.  RETROPERITONEUM:  No mass or adenopathy.  BOWEL/MESENTERY:  There is some fatty infiltration of the colon.  This is nonspecific but can be seen with chronic inflammation.  No dilated loops of small bowel are seen.  Portions of the bowel are decompressed, limiting evaluation.  No evidence of obstruction.  Lack of oral contrast somewhat limits assessment. ABDOMINAL WALL:  No mass or hernia.  URINARY BLADDER:  The urinary bladder is decompressed which limits assessment. PELVIC NODES:  No adenopathy.  PELVIC ORGANS:  Probable fibroid uterus present.  Please note that uterus and ovaries are not well assessed with CT. BONES:  No bony lesion or fracture.   LUNG BASES:  No visible pulmonary or pleural disease.  OTHER:  Negative.             CONCLUSION:  There is an area of masslike soft tissue attenuation in the region of the pancreatic head and uncinate process.  There are no prior studies available for comparison.  This is highly concerning for a neoplastic process.  Further evaluation is  recommended with endoscopic ultrasound.  There is fat stranding centered on the pancreatic head and peripancreatic region suspicious for pancreatitis.  Moderate to severe extrahepatic biliary ductal dilatation is present.  Critical results were discussed with Dr. Gusman by Dr. Dash at approximately 1917 on 10/25/24.  Read back was performed.    LOCATION:  [id number]   Dictated by (CST): Kush Dash MD on 10/25/2024 at 7:11 PM     Finalized by (CST): Kush Dash MD on 10/25/2024 at 7:17 PM             [facility]      Differential Diagnosis  49-year-old female presents today for evaluation of abdominal pain and nausea/vomiting.  Patient has a history of regular alcohol intake in the past.  Differential would include pancreatitis, gastritis, bowel obstruction, intra-abdominal abscess.  Plan for CT along with labs.  Will reassess    7:32 pm  Patient's imaging demonstrates inflammatory changes around the pancreas.  There is also a mass which may be compressing the biliary tree.  I reviewed the concerning findings with patient and significant other, including the pancreatic mass.  Will admit to the hospital for further care.  I spoke with the hospitalist in regards to admission and GI Dr. Kline in regards consultation.    Discussions of Management  I spoke with the hospitalist in regards to admission and GI Dr. Kline in regards consultation.    Admission disposition: 10/25/2024  8:07 PM           Medical Decision Making      Disposition and Plan     Clinical Impression:  1. Acute pancreatitis, unspecified complication status, unspecified pancreatitis type (HCC)    2. Pancreatic mass  (HCC)         Disposition:  Admit  10/25/2024  8:07 pm    Follow-up:  No follow-up provider specified.        Medications Prescribed:  Current Discharge Medication List              Supplementary Documentation:         Hospital Problems       Present on Admission             ICD-10-CM Noted POA    * (Principal) Acute pancreatitis, unspecified complication status, unspecified pancreatitis type (HCC) K85.90 10/25/2024 Unknown    Pancreatic mass (HCC) K86.89 10/25/2024 Unknown

## 2024-10-25 NOTE — ED INITIAL ASSESSMENT (HPI)
Pt arrives to ED for evaluation of n/v for the past three hours, pt states the symptoms started abruptly, pt has a hx of pancreatic cysts.     Pt is actively dryheaving, BP elevated, please recheck.

## 2024-10-26 LAB
ALBUMIN SERPL-MCNC: 3.4 G/DL (ref 3.2–4.8)
ALBUMIN/GLOB SERPL: 1 {RATIO} (ref 1–2)
ALP LIVER SERPL-CCNC: 226 U/L
ALT SERPL-CCNC: 33 U/L
ANION GAP SERPL CALC-SCNC: 3 MMOL/L (ref 0–18)
AST SERPL-CCNC: 171 U/L (ref ?–34)
ATRIAL RATE: 53 BPM
BASOPHILS # BLD AUTO: 0.03 X10(3) UL (ref 0–0.2)
BASOPHILS NFR BLD AUTO: 0.6 %
BILIRUB SERPL-MCNC: 2.1 MG/DL (ref 0.3–1.2)
BILIRUB UR QL CFM: NEGATIVE
BUN BLD-MCNC: 5 MG/DL (ref 9–23)
CALCIUM BLD-MCNC: 9.6 MG/DL (ref 8.7–10.4)
CHLORIDE SERPL-SCNC: 106 MMOL/L (ref 98–112)
CLARITY UR REFRACT.AUTO: CLEAR
CO2 SERPL-SCNC: 27 MMOL/L (ref 21–32)
COLOR UR AUTO: YELLOW
CREAT BLD-MCNC: 0.55 MG/DL
EGFRCR SERPLBLD CKD-EPI 2021: 112 ML/MIN/1.73M2 (ref 60–?)
EOSINOPHIL # BLD AUTO: 0.05 X10(3) UL (ref 0–0.7)
EOSINOPHIL NFR BLD AUTO: 1.1 %
ERYTHROCYTE [DISTWIDTH] IN BLOOD BY AUTOMATED COUNT: 14.8 %
GLOBULIN PLAS-MCNC: 3.3 G/DL (ref 2–3.5)
GLUCOSE BLD-MCNC: 90 MG/DL (ref 70–99)
GLUCOSE UR STRIP.AUTO-MCNC: NEGATIVE MG/DL
HCT VFR BLD AUTO: 37.8 %
HGB BLD-MCNC: 13.1 G/DL
IMM GRANULOCYTES # BLD AUTO: 0.02 X10(3) UL (ref 0–1)
IMM GRANULOCYTES NFR BLD: 0.4 %
INR BLD: 0.99 (ref 0.8–1.2)
LEUKOCYTE ESTERASE UR QL STRIP.AUTO: NEGATIVE
LYMPHOCYTES # BLD AUTO: 1.03 X10(3) UL (ref 1–4)
LYMPHOCYTES NFR BLD AUTO: 21.8 %
MCH RBC QN AUTO: 35.7 PG (ref 26–34)
MCHC RBC AUTO-ENTMCNC: 34.7 G/DL (ref 31–37)
MCV RBC AUTO: 103 FL
MONOCYTES # BLD AUTO: 0.39 X10(3) UL (ref 0.1–1)
MONOCYTES NFR BLD AUTO: 8.2 %
NEUTROPHILS # BLD AUTO: 3.21 X10 (3) UL (ref 1.5–7.7)
NEUTROPHILS # BLD AUTO: 3.21 X10(3) UL (ref 1.5–7.7)
NEUTROPHILS NFR BLD AUTO: 67.9 %
NITRITE UR QL STRIP.AUTO: NEGATIVE
OSMOLALITY SERPL CALC.SUM OF ELEC: 279 MOSM/KG (ref 275–295)
P AXIS: 18 DEGREES
P-R INTERVAL: 170 MS
PH UR STRIP.AUTO: 6 [PH] (ref 5–8)
PLATELET # BLD AUTO: 112 10(3)UL (ref 150–450)
PLATELETS.RETICULATED NFR BLD AUTO: 8.1 % (ref 0–7)
POTASSIUM SERPL-SCNC: 3.6 MMOL/L (ref 3.5–5.1)
PROT SERPL-MCNC: 6.7 G/DL (ref 5.7–8.2)
PROTHROMBIN TIME: 13.2 SECONDS (ref 11.6–14.8)
Q-T INTERVAL: 518 MS
QRS DURATION: 80 MS
QTC CALCULATION (BEZET): 486 MS
R AXIS: 38 DEGREES
RBC # BLD AUTO: 3.67 X10(6)UL
RBC UR QL AUTO: NEGATIVE
SODIUM SERPL-SCNC: 136 MMOL/L (ref 136–145)
SP GR UR STRIP.AUTO: 1.02 (ref 1–1.03)
T AXIS: 38 DEGREES
TRIGL SERPL-MCNC: 84 MG/DL (ref 30–149)
UROBILINOGEN UR STRIP.AUTO-MCNC: 2 MG/DL
VENTRICULAR RATE: 53 BPM
WBC # BLD AUTO: 4.7 X10(3) UL (ref 4–11)

## 2024-10-26 PROCEDURE — 99232 SBSQ HOSP IP/OBS MODERATE 35: CPT | Performed by: HOSPITALIST

## 2024-10-26 RX ORDER — AMLODIPINE BESYLATE 5 MG/1
5 TABLET ORAL DAILY
Status: DISCONTINUED | OUTPATIENT
Start: 2024-10-26 | End: 2024-10-27

## 2024-10-26 NOTE — PLAN OF CARE
Assumed care at 0730.  A&O 4.  Room air.  Fluids- LR at 150 mL/hr.  NPO except sips with meds and ice chips.  Tele- NSR/ST.  VTE- Lovenox.  PRN pain and nausea meds utilized. See MAR for more details.   Independent after set up to the bathroom.    Pt oriented to the room, safety prec initiated, bed is in the lowest position and call light within reach. Pt updated on the plan of care. All needs met at this time.     1212: Diet advanced to clear liquid with an order to advance as tolerated.     Problem: Patient/Family Goals  Goal: Patient/Family Long Term Goal  Description: Patient's Long Term Goal: Discharge home  Interventions:  - pain management  - nausea management  - GI consult  - See additional Care Plan goals for specific interventions  Outcome: Progressing  Goal: Patient/Family Short Term Goal  Description: Patient's Short Term Goal: \"be able to keep down food and liquid\"  Interventions:   - Bowel rest- NPO, sips with meds and ice chips  - Nausea management  - GI consult  - See additional Care Plan goals for specific interventions  Outcome: Progressing     Problem: DISCHARGE PLANNING  Goal: Discharge to home or other facility with appropriate resources  Description: INTERVENTIONS:  - Identify barriers to discharge w/pt and caregiver  - Include patient/family/discharge partner in discharge planning  - Arrange for needed discharge resources and transportation as appropriate  - Identify discharge learning needs (meds, wound care, etc)  - Arrange for interpreters to assist at discharge as needed  - Consider post-discharge preferences of patient/family/discharge partner  - Complete POLST form as appropriate  - Assess patient's ability to be responsible for managing their own health  - Refer to Case Management Department for coordinating discharge planning if the patient needs post-hospital services based on physician/LIP order or complex needs related to functional status, cognitive ability or social support  system  Outcome: Progressing     Problem: PAIN - ADULT  Goal: Verbalizes/displays adequate comfort level or patient's stated pain goal  Description: INTERVENTIONS:  - Encourage pt to monitor pain and request assistance  - Assess pain using appropriate pain scale  - Administer analgesics based on type and severity of pain and evaluate response  - Implement non-pharmacological measures as appropriate and evaluate response  - Consider cultural and social influences on pain and pain management  - Manage/alleviate anxiety  - Utilize distraction and/or relaxation techniques  - Monitor for opioid side effects  - Notify MD/LIP if interventions unsuccessful or patient reports new pain  - Anticipate increased pain with activity and pre-medicate as appropriate  Outcome: Progressing

## 2024-10-26 NOTE — SPIRITUAL CARE NOTE
Spiritual Care Visit Note    Patient Name: Yenifer Gonsales Date of Spiritual Care Visit: 10/25/24   : 1975 Primary Dx: Acute pancreatitis, unspecified complication status, unspecified pancreatitis type (HCC)       Referred By: Referral From: Other (Comment) (Rounds)    Spiritual Care Taxonomy:    Intended Effects: Demonstrate caring and concern    Methods: Demonstrate acceptance;Offer emotional support    Interventions: Acknowledge current situation;Acknowledge response to difficult experience;Active listening;Ask guided questions;Ask questions to bring forth feelings;Assist with identifying strengths;Discuss concerns;Discuss frustrations with someone;Explain  role    Visit Type/Summary:  [place] and her  were on bus tour.  She said she felt worse then the last time \"this\" happened.   is worried that [place] need [place] stay [place] needs to get back to work.  She stated she is afraid  will be upset with her if she does not get well soon enough - and stated she doesn't think she will get well as fast as \"last time.\"  They are awaiting a room and believe their will be more tests.  has a hotel room for at least 2 nights.     - Spiritual Care: Offered empathic listening and emotional support. Patient and family expressed appreciation for  visit.  remains available as needed for follow up.    Spiritual Care support can be requested via an Epic consult. For urgent/immediate needs, please contact the On Call  at: Edward: ext [phone]    Rev. WENDY King MDiv

## 2024-10-26 NOTE — ED QUICK NOTES
Orders for admission, patient is aware of plan and ready to go upstairs. Any questions, please call ED RN Tami at extension [phone].     Patient Covid vaccination status: Unvaccinated     COVID Test Ordered in ED: None    COVID Suspicion at Admission: N/A    Running Infusions:    sodium chloride 125 mL/hr at 10/25/24 1756        Mental Status/LOC at time of transport: A&Ox4    Other pertinent information:   CIWA score: N/A   NIH score:  N/A

## 2024-10-26 NOTE — PROGRESS NOTES
NURSING ADMISSION NOTE      Patient admitted via Cart  Oriented to room.  Safety precautions initiated.  Bed in low position.  Call light in reach.    Patient oriented x 4   Placed tele and    Completed admission navigation  Iv fluids started   Pain management with prn morphine    Pt updated on poc and verbalizes understanding   Needs met at this time

## 2024-10-26 NOTE — PROGRESS NOTES
[facility]   part of [facility]     Hospitalist Progress Note     Yenifer Gonsales Patient Status:  Inpatient    1975 MRN [id number]   Location [facility] 3NE-A Attending Alexis Howard MD   Hosp Day # 1 PCP No primary care provider on file.     Chief Complaint: Abdominal pain    Subjective:     Patient remains NPO, c/o abdominal pain, nausea, no vomiting.    Objective:    Review of Systems:   A comprehensive review of systems was completed; pertinent positive and negatives stated in subjective.    Vital signs:  Temp:  [97.4 °F (36.3 °C)-98.9 °F (37.2 °C)] 97.9 °F (36.6 °C)  Pulse:  [55-76] 72  Resp:  [13-31] 31  BP: (155-238)/() 155/103  SpO2:  [94 %-98 %] 98 %    Physical Exam:    General: No acute distress  Respiratory: No wheezes, no rhonchi  Cardiovascular: S1, S2, regular rate and rhythm  Abdomen: Soft, epigastric tenderness, non-distended, positive bowel sounds  Neuro: No new focal deficits.   Extremities: No edema      Diagnostic Data:    Labs:  Recent Labs   Lab 10/25/24  1754 10/26/24  0652   WBC 6.7 4.7   HGB 15.0 13.1   .3* 103.0*   .0* 112.0*       Recent Labs   Lab 10/25/24  1754 10/26/24  0652   * 90   BUN 7* 5*   CREATSERUM 0.71 0.55   CA 10.1 9.6   ALB 4.0 3.4    136   K 4.3 3.6    106   CO2 24.0 27.0   ALKPHO 228* 226*   AST 95* 171*   ALT 29 33   BILT 1.7* 2.1*   TP 7.8 6.7       Estimated Creatinine Clearance: 136.8 mL/min (based on SCr of 0.55 mg/dL).    Recent Labs   Lab 10/25/24  1754   TROPHS 4       No results for input(s): \"PTP\", \"INR\" in the last 168 hours.               Microbiology    No results found for this visit on 10/25/24.      Imaging: Reviewed in Epic.    Medications:    metoprolol succinate ER  100 mg Oral Daily Beta Blocker    pantoprazole  40 mg Oral QAM AC    enoxaparin  40 mg Subcutaneous Daily       Assessment & Plan:      #Abdominal pain from acute pancreatitis and pancreatic mass  Pain control  Bowel rest  IVF at  150cc/hr   Anti-emetics  GI following  Trial of CLD later today    #Pancreatic mass  Recommend outpt w/u with EUS and biopsy, sooner if warranted  GI following    #Etoh abuse  Cessation advised       Alexis Howard MD    Supplementary Documentation:     Quality:  DVT Mechanical Prophylaxis:   SCDs,    DVT Pharmacologic Prophylaxis   Medication    enoxaparin (Lovenox) 40 MG/0.4ML SUBQ injection 40 mg                Code Status: Not on file  Alvarado: No urinary catheter in place  Alvarado Duration (in days):   Central line:    BASSEM:     Discharge is dependent on: progress  At this point Ms. Gonsales is expected to be discharge to: home    The 21st Century Cures Act makes medical notes like these available to patients in the interest of transparency. Please be advised this is a medical document. Medical documents are intended to carry relevant information, facts as evident, and the clinical opinion of the practitioner. The medical note is intended as peer to peer communication and may appear blunt or direct. It is written in medical language and may contain abbreviations or verbiage that are unfamiliar.

## 2024-10-26 NOTE — H&P
[facility]     Yenifer Gonsales Patient Status:  Inpatient    1975 MRN [id number]   Location [facility] 3NE-A Attending Kimani Han MD   Hosp Day # 0 PCP No primary care provider on file.     Chief Complaint: abd pain     Subjective:    History of Present Illness:     Yenifer Gonsales is a 49 year old female with PMH pancreatitis who p/w abd pain. Pt from [place] and was on a bus with her  and friends driving through the state. On the way, pt developed epigastric abdominal pain. Severe, constant. Radiated to the back and a/w n/v. Denies f/c, cp, sob. Similar to previous episodes of pancreatitis. Has a noted 2.2 cm pancreatic cyst. Drinks 4-6x a week. Denies hx of withdrawal or seizure.     History/Other:    Past Medical History:  Past Medical History:    Pancreatitis (HCC)     Past Surgical History:   History reviewed. No pertinent surgical history.   Family History:   No family history on file.  Social History:    reports that she has been smoking cigarettes. She has never used smokeless tobacco. She reports that she does not use drugs.     Allergies: Allergies[1]    Medications:  Medications Ordered Prior to Encounter[2]    Review of Systems:   A comprehensive review of systems was completed.    Pertinent positives and negatives noted in the HPI.    Objective:   Physical Exam:    BP (!) 171/103 (BP Location: Right arm)   Pulse 73   Temp 97.4 °F (36.3 °C) (Oral)   Resp 26   Ht 5' 11\" (1.803 m)   Wt 209 lb (94.8 kg)   SpO2 94%   BMI 29.15 kg/m²   General: No acute distress, Alert  Respiratory: No rhonchi, no wheezes  Cardiovascular: S1, S2. Regular rate and rhythm  Abdomen: Soft, epigastric-tender, non-distended, positive bowel sounds  Neuro: No new focal deficits  Extremities: No edema      Results:    Labs:      Labs Last 24 Hours:    Recent Labs   Lab 10/25/24  1754   RBC 4.26   HGB 15.0   HCT 43.6   .3*   MCH 35.2*   MCHC 34.4   RDW 14.7   NEPRELIM  5.17   WBC 6.7   .0*       Recent Labs   Lab 10/25/24  1754   *   BUN 7*   CREATSERUM 0.71   EGFRCR 104   CA 10.1   ALB 4.0      K 4.3      CO2 24.0   ALKPHO 228*   AST 95*   ALT 29   BILT 1.7*   TP 7.8       No results found for: \"PT\", \"INR\"    Recent Labs   Lab 10/25/24  1754   TROPHS 4       No results for input(s): \"TROP\", \"PBNP\" in the last 168 hours.    No results for input(s): \"PCT\" in the last 168 hours.    Imaging: Imaging data reviewed in Epic.    Assessment & Plan:      #acute pancreatitis, etoh induced vs 2/2 mass/cyst  -NPO  -IVF  -GI consult  -IV pain control    #pancreatic mass  #biliary duct dilatation  -previous CT's at OSH w/ 2.2 cm cyst. Now 4.5 x 3.4 cm.  -may need EUS and biopsy  -GI    #etoh abuse  -monitor for signs of w/d        Plan of care discussed with pt, ed phsyciibarry Contreras MD    Supplementary Documentation:     The 21st Century Cures Act makes medical notes like these available to patients in the interest of transparency. Please be advised this is a medical document. Medical documents are intended to carry relevant information, facts as evident, and the clinical opinion of the practitioner. The medical note is intended as peer to peer communication and may appear blunt or direct. It is written in medical language and may contain abbreviations or verbiage that are unfamiliar.               **Certification      PHYSICIAN Certification of Need for Inpatient Hospitalization - Initial Certification    Patient will require inpatient services that will reasonably be expected to span two midnight's based on the clinical documentation in H+P.   Based on patients current state of illness, I anticipate that, after discharge, patient will require TBD.                      [1]   Allergies  Allergen Reactions    Allergy Decongestant ANAPHYLAXIS    Doxycycline HIVES    Imipramine UNKNOWN, OTHER (SEE COMMENTS) and SWELLING     Swelling of throat, hives    Nadolol  OTHER (SEE COMMENTS)     Decrease HR-was hospitalized    Prednisone HIVES and OTHER (SEE COMMENTS)     Agitated by noise, very overwhelming    Pseudoephedrine SHORTNESS OF BREATH    Scopolamine OTHER (SEE COMMENTS)     Affected vision-couldn't see    Erythromycin UNKNOWN, NAUSEA AND VOMITING and OTHER (SEE COMMENTS)     N+V, Hives     N+V, Hives    Azithromycin OTHER (SEE COMMENTS)     vomiting    Chlorpheniramine-Pseudoeph UNKNOWN and OTHER (SEE COMMENTS)    Prednisolone UNKNOWN    Soybean Allergy UNKNOWN   [2]   No current facility-administered medications on file prior to encounter.     Current Outpatient Medications on File Prior to Encounter   Medication Sig Dispense Refill    metoprolol succinate  MG Oral Tablet 24 Hr Take 1 tablet (100 mg total) by mouth daily.      diazePAM 2 MG Oral Tab Take 1 tablet (2 mg total) by mouth every 8 (eight) hours as needed.      naproxen 500 MG Oral Tab Take 1 tablet (500 mg total) by mouth 2 (two) times daily as needed.      famotidine 20 MG Oral Tab Take 1 tablet (20 mg total) by mouth at bedtime. (Patient taking differently: Take 1 tablet (20 mg total) by mouth nightly as needed.)      ondansetron 4 MG Oral Tablet Dispersible Take 1 tablet (4 mg total) by mouth every 8 (eight) hours as needed for Nausea.      pantoprazole 40 MG Oral Tab EC Take 1 tablet (40 mg total) by mouth every morning before breakfast.      venlafaxine ER (EFFEXOR XR) 37.5 MG Oral Capsule SR 24 Hr Take 1 capsule (37.5 mg total) by mouth daily.

## 2024-10-26 NOTE — CONSULTS
[facility]  GASTROENTEROLOGY NEW CONSULT NOTE   [facility]     Yenifer Gonsales Patient Status:  Inpatient    1975 MRN [id number]   Location [facility] 3NE-A Attending Alexis Howard MD   Hosp Day # 1 PCP No primary care provider on file.       Reason for Consultation:   Pancreatitis, pancreatic mass    History of Present Illness (HPI):  Yenifer Gonsales is a 49 year old female with chronic medical conditions including alcohol abuse and episode of acute pancreatitis that presented to the ER yesterday with epigastric abdominal pain, nausea, and emesis.  Patient is currently noted to be visiting here from [place].  Patient notes pain started around 1 PM with epiastric pain that radiated to her back.  She had nausea and emesis associated with it.  She admits to continued alcohol use and did have some beer and a mixed drink yesterday and also continued to have a some wine every few days previously to this.  In the ER she was noted to have acute pancreatitis with elevated lipase, and CT showing pancreatitis.  She was also noted to have a 4.5 cm x 3.4 cm area of masslike attenuation in the head of the pancreas and uncinate process along extrahepatic biliary ductal dilatation.  She has previously been followed for this and initially had been thought to be a cyst.  Her most recent CT scan prior to her scan admission was from 2024 showing a 2.2 cm hypoattenuating lesion within the pancreatic head.  She denies prior EUS for this.  She had an EGD in 2024 with LA grade C esophagitis, medium size hiatal hernia, sleeve gastrectomy, and grade 1 esophageal varices.  Patient denies any history of cirrhosis.  Treatment of elevated liver enzymes on admission with , ALT 33, alkaline phosphatase 226, total bili 2.1.  CBC with hemoglobin of 13.1 with MCV of 103 and platelets of 112.  Lipase 172 on admission (upper limit of normal 53).  She is also noted to have recently undergone a  bone marrow biopsy on October 16, 2024 with pathology showing secondary thrombocytopenia and RBC macrocytosis and a normal cellular marrow that is thought to be due to alcohol induced mild thrombocytopenia.    Past Medical History:  Past Medical History:    Pancreatitis (HCC)       Past Surgical History:  History reviewed. No pertinent surgical history.    Family History:  No first-degree relative with a history of colorectal cancer.    Social History:  No IVDU      Medications:    sodium chloride 0.9% infusion   Intravenous Continuous    [COMPLETED] ondansetron (Zofran) 4 MG/2ML injection 4 mg  4 mg Intravenous Once    [COMPLETED] morphINE PF 4 MG/ML injection 4 mg  4 mg Intravenous Once    [COMPLETED] iopamidol 76% (ISOVUE-370) injection for power injector  100 mL Intravenous ONCE PRN    [COMPLETED] morphINE PF 4 MG/ML injection 4 mg  4 mg Intravenous Once    [COMPLETED] ondansetron (Zofran) 4 MG/2ML injection 4 mg  4 mg Intravenous Once    [COMPLETED] hydrALAzine (Apresoline) 20 mg/mL injection 10 mg  10 mg Intravenous Once    diazePAM (Valium) tab 2 mg  2 mg Oral Q8H PRN    famotidine (Pepcid) tab 20 mg  20 mg Oral Nightly PRN    metoprolol succinate ER (Toprol XL) 24 hr tab 100 mg  100 mg Oral Daily Beta Blocker    pantoprazole (Protonix) DR tab 40 mg  40 mg Oral QAM AC    morphINE PF 2 MG/ML injection 1 mg  1 mg Intravenous Q2H PRN    Or    morphINE PF 2 MG/ML injection 2 mg  2 mg Intravenous Q2H PRN    Or    morphINE PF 4 MG/ML injection 4 mg  4 mg Intravenous Q2H PRN    acetaminophen (Tylenol Extra Strength) tab 1,000 mg  1,000 mg Oral Q4H PRN    melatonin tab 3 mg  3 mg Oral Nightly PRN    polyethylene glycol (PEG 3350) (Miralax) 17 g oral packet 17 g  17 g Oral Daily PRN    sennosides (Senokot) tab 17.2 mg  17.2 mg Oral Nightly PRN    bisacodyl (Dulcolax) 10 MG rectal suppository 10 mg  10 mg Rectal Daily PRN    fleet enema (Fleet) rectal enema 133 mL  1 enema Rectal Once PRN    benzonatate (Tessalon) cap  200 mg  200 mg Oral TID PRN    lactated ringers infusion   Intravenous Continuous    enoxaparin (Lovenox) 40 MG/0.4ML SUBQ injection 40 mg  40 mg Subcutaneous Daily    acetaminophen (Tylenol) tab 650 mg  650 mg Oral Q4H PRN    Or    HYDROcodone-acetaminophen (Norco) 5-325 MG per tab 1 tablet  1 tablet Oral Q4H PRN    Or    HYDROcodone-acetaminophen (Norco) 5-325 MG per tab 2 tablet  2 tablet Oral Q4H PRN    ondansetron (Zofran) 4 MG/2ML injection 4 mg  4 mg Intravenous Q6H PRN    prochlorperazine (Compazine) 10 MG/2ML injection 5 mg  5 mg Intravenous Q8H PRN       Allergies:  Allergies[1]    Review of Systems  Negative unless otherwise mentioned in HPI.     Physical Exam  BP (!) 155/103 (BP Location: Right arm)   Pulse 72   Temp 97.9 °F (36.6 °C) (Oral)   Resp (!) 31   Ht 5' 11\" (1.803 m)   Wt 209 lb (94.8 kg)   SpO2 98%   BMI 29.15 kg/m²   Body mass index is 29.15 kg/m².      Gen: Awake and alert, NAD  Eyes:  no scleral icterus  Cardiovascular: Warm, well-perfused  Respiratory: No respiratory distress  Abd: Soft, mild tender to palpation epigastric region, non-distended. No rebound tenderness, no guarding.  Neuro:  Alert and oriented to name, location and time.     Diagnostic Data:      Labs:  Recent Labs   Lab 10/25/24  1754 10/26/24  0652   WBC 6.7 4.7   HGB 15.0 13.1   .3* 103.0*   .0* 112.0*       Recent Labs   Lab 10/25/24  1754 10/26/24  0652   * 90   BUN 7* 5*   CREATSERUM 0.71 0.55   CA 10.1 9.6   ALB 4.0 3.4    136   K 4.3 3.6    106   CO2 24.0 27.0   ALKPHO 228* 226*   AST 95* 171*   ALT 29 33   BILT 1.7* 2.1*   TP 7.8 6.7       No results for input(s): \"PTP\", \"INR\" in the last 168 hours.           Imaging: Imaging data reviewed in Epic.    Medications:    metoprolol succinate ER  100 mg Oral Daily Beta Blocker    pantoprazole  40 mg Oral QAM AC    enoxaparin  40 mg Subcutaneous Daily       Allergies:  Allergies[2]    Imaging:  I have personally reviewed all  pertinent available imaging.       ASSESSMENT / PLAN:     Yenifer Gonsales is a 49 year old female with chronic medical conditions including recent episodes of recurrent acute alcoholic pancreatitis, thrombocytopenia, and continued alcohol use that presented to the ER with epigastric pain, nausea, emesis.    Epigastric pain, nausea, and emesis secondary to alcoholic pancreatitis  Alcoholic pancreatitis-CT with evidence of pancreatitis and lipase greater than 3 times upper limit of normal along with epigastric abdominal pain  Alcohol abuse  Abnormal CT scan concerning for mass in the head of the pancreas with associated extrahepatic biliary ductal dilatation.  This was previously thought to be a pancreatic cyst likely due to her prior episodes of pancreatitis, however appears to have grown in size compared to previous CT worse measured at 2.2 cm now measuring 4.5 cm.  Elevated liver enzymes.  Suspect this is secondary to alcoholic hepatitis, however may also obstructive component with possible pancreatic head mass  Thrombocytopenia-likely secondary to alcohol induced thrombocytopenia, however she was also noted to have varices on EGD from February 2024 so may have cirrhosis?  Possible alcoholic cirrhosis-noted to have varices on EGD from February 2024 along with thrombocytopenia.  CT however showing normal-appearing liver and albumin is within normal limits.      Recommendations:   N.p.o. and advance to clear liquid diet as tolerated.  Consider advancing to GI soft low-fat diet once pain is controlled  Pain control and antiemetics per primary  IV fluids with lactated Ringer's at 150 mL/h  Patient will need further workup and evaluation of possible pancreatic mass with endoscopic ultrasound.  Would likely recommend SNF outpatient once acute pancreatitis episode resolves as acute inflammation will likely hinder visualization.  Additionally there are no current signs of cholangitis suggest urgent need for endoscopic  ultrasound.  However, pending clinical course, may need to consider earlier inpatient endoscopic evaluation  Advised absolute alcohol cessation  Check INR  Monitor CBC, CMP, and INR daily    Thank you for the consult and allowing us to participate in patient's care.  GI will follow. Please page with any questions or concerns.     Cj Kline DO  10/26/2024  [facility]             [1]   Allergies  Allergen Reactions    Allergy Decongestant ANAPHYLAXIS    Doxycycline HIVES    Imipramine UNKNOWN, OTHER (SEE COMMENTS) and SWELLING     Swelling of throat, hives    Nadolol OTHER (SEE COMMENTS)     Decrease HR-was hospitalized    Prednisone HIVES and OTHER (SEE COMMENTS)     Agitated by noise, very overwhelming    Pseudoephedrine SHORTNESS OF BREATH    Scopolamine OTHER (SEE COMMENTS)     Affected vision-couldn't see    Erythromycin UNKNOWN, NAUSEA AND VOMITING and OTHER (SEE COMMENTS)     N+V, Hives     N+V, Hives    Azithromycin OTHER (SEE COMMENTS)     vomiting    Chlorpheniramine-Pseudoeph UNKNOWN and OTHER (SEE COMMENTS)    Prednisolone UNKNOWN    Soybean Allergy UNKNOWN   [2]   Allergies  Allergen Reactions    Allergy Decongestant ANAPHYLAXIS    Doxycycline HIVES    Imipramine UNKNOWN, OTHER (SEE COMMENTS) and SWELLING     Swelling of throat, hives    Nadolol OTHER (SEE COMMENTS)     Decrease HR-was hospitalized    Prednisone HIVES and OTHER (SEE COMMENTS)     Agitated by noise, very overwhelming    Pseudoephedrine SHORTNESS OF BREATH    Scopolamine OTHER (SEE COMMENTS)     Affected vision-couldn't see    Erythromycin UNKNOWN, NAUSEA AND VOMITING and OTHER (SEE COMMENTS)     N+V, Hives     N+V, Hives    Azithromycin OTHER (SEE COMMENTS)     vomiting    Chlorpheniramine-Pseudoeph UNKNOWN and OTHER (SEE COMMENTS)    Prednisolone UNKNOWN    Soybean Allergy UNKNOWN

## 2024-10-27 VITALS
DIASTOLIC BLOOD PRESSURE: 95 MMHG | OXYGEN SATURATION: 92 % | SYSTOLIC BLOOD PRESSURE: 162 MMHG | BODY MASS INDEX: 29.26 KG/M2 | HEART RATE: 69 BPM | WEIGHT: 209 LBS | HEIGHT: 71 IN | RESPIRATION RATE: 14 BRPM | TEMPERATURE: 99 F

## 2024-10-27 LAB
ALBUMIN SERPL-MCNC: 3.2 G/DL (ref 3.2–4.8)
ALBUMIN/GLOB SERPL: 1.1 {RATIO} (ref 1–2)
ALP LIVER SERPL-CCNC: 257 U/L
ALT SERPL-CCNC: 37 U/L
ANION GAP SERPL CALC-SCNC: 5 MMOL/L (ref 0–18)
AST SERPL-CCNC: 101 U/L (ref ?–34)
BASOPHILS # BLD AUTO: 0.02 X10(3) UL (ref 0–0.2)
BASOPHILS NFR BLD AUTO: 0.5 %
BILIRUB SERPL-MCNC: 2 MG/DL (ref 0.3–1.2)
BUN BLD-MCNC: <5 MG/DL (ref 9–23)
CALCIUM BLD-MCNC: 9.3 MG/DL (ref 8.7–10.4)
CHLORIDE SERPL-SCNC: 105 MMOL/L (ref 98–112)
CO2 SERPL-SCNC: 28 MMOL/L (ref 21–32)
CREAT BLD-MCNC: 0.52 MG/DL
EGFRCR SERPLBLD CKD-EPI 2021: 113 ML/MIN/1.73M2 (ref 60–?)
EOSINOPHIL # BLD AUTO: 0.19 X10(3) UL (ref 0–0.7)
EOSINOPHIL NFR BLD AUTO: 4.5 %
ERYTHROCYTE [DISTWIDTH] IN BLOOD BY AUTOMATED COUNT: 15 %
GLOBULIN PLAS-MCNC: 2.9 G/DL (ref 2–3.5)
GLUCOSE BLD-MCNC: 79 MG/DL (ref 70–99)
HCT VFR BLD AUTO: 35.8 %
HGB BLD-MCNC: 12.5 G/DL
IMM GRANULOCYTES # BLD AUTO: 0.01 X10(3) UL (ref 0–1)
IMM GRANULOCYTES NFR BLD: 0.2 %
INR BLD: 0.99 (ref 0.8–1.2)
LYMPHOCYTES # BLD AUTO: 0.77 X10(3) UL (ref 1–4)
LYMPHOCYTES NFR BLD AUTO: 18.2 %
MCH RBC QN AUTO: 36.2 PG (ref 26–34)
MCHC RBC AUTO-ENTMCNC: 34.9 G/DL (ref 31–37)
MCV RBC AUTO: 103.8 FL
MONOCYTES # BLD AUTO: 0.35 X10(3) UL (ref 0.1–1)
MONOCYTES NFR BLD AUTO: 8.3 %
NEUTROPHILS # BLD AUTO: 2.88 X10 (3) UL (ref 1.5–7.7)
NEUTROPHILS # BLD AUTO: 2.88 X10(3) UL (ref 1.5–7.7)
NEUTROPHILS NFR BLD AUTO: 68.3 %
PLATELET # BLD AUTO: 86 10(3)UL (ref 150–450)
POTASSIUM SERPL-SCNC: 3.4 MMOL/L (ref 3.5–5.1)
PROT SERPL-MCNC: 6.1 G/DL (ref 5.7–8.2)
PROTHROMBIN TIME: 13.2 SECONDS (ref 11.6–14.8)
RBC # BLD AUTO: 3.45 X10(6)UL
SODIUM SERPL-SCNC: 138 MMOL/L (ref 136–145)
WBC # BLD AUTO: 4.2 X10(3) UL (ref 4–11)

## 2024-10-27 PROCEDURE — 99239 HOSP IP/OBS DSCHRG MGMT >30: CPT | Performed by: HOSPITALIST

## 2024-10-27 NOTE — PROGRESS NOTES
Patient signed herself out against medical advice. Form signed and placed in the paper chart. MD's at bedside explained the risks in detail. Patients plan is to drive back to [place] where they live and go to the nearest hospital. Peripheral IV discontinued.

## 2024-10-27 NOTE — PROGRESS NOTES
[facility]   part of [facility]     Hospitalist Progress Note     Yenifer Gonsales Patient Status:  Inpatient    1975 MRN [id number]   Location [facility] 3NE-A Attending Alexis Howard MD   Hosp Day # 2 PCP No primary care provider on file.     Chief Complaint: Abdominal pain    Subjective:     Patient states she is leaving AMA,  at the bedside, states she wants to be closer to home and also does not wish \"to fight him on this.\"  States abdominal pain controlled at this time, denies nausea, vomiting, was tolerating CLD.    Objective:    Review of Systems:   A comprehensive review of systems was completed; pertinent positive and negatives stated in subjective.    Vital signs:  Temp:  [97.9 °F (36.6 °C)-98.9 °F (37.2 °C)] 98.6 °F (37 °C)  Pulse:  [61-79] 69  Resp:  [12-23] 14  BP: (130-184)/() 162/95  SpO2:  [92 %-97 %] 92 %    Physical Exam:    General: No acute distress  Respiratory: No wheezes, no rhonchi  Cardiovascular: S1, S2, regular rate and rhythm  Abdomen: Soft, epigastric tenderness, non-distended, positive bowel sounds  Neuro: No new focal deficits.   Extremities: No edema      Diagnostic Data:    Labs:  Recent Labs   Lab 10/25/24  1754 10/26/24  0652 10/26/24  1204 10/27/24  0650   WBC 6.7 4.7  --  4.2   HGB 15.0 13.1  --  12.5   .3* 103.0*  --  103.8*   .0* 112.0*  --  86.0*   INR  --   --  0.99 0.99       Recent Labs   Lab 10/25/24  1754 10/26/24  0652 10/27/24  0650   * 90 79   BUN 7* 5* <5*   CREATSERUM 0.71 0.55 0.52*   CA 10.1 9.6 9.3   ALB 4.0 3.4 3.2    136 138   K 4.3 3.6 3.4*    106 105   CO2 24.0 27.0 28.0   ALKPHO 228* 226* 257*   AST 95* 171* 101*   ALT 29 33 37   BILT 1.7* 2.1* 2.0*   TP 7.8 6.7 6.1       Estimated Creatinine Clearance: 144.7 mL/min (A) (based on SCr of 0.52 mg/dL (L)).    Recent Labs   Lab 10/25/24  1754   TROPHS 4       Recent Labs   Lab 10/26/24  1204 10/27/24  0650   PTP 13.2 13.2   INR 0.99 0.99                   Microbiology    No results found for this visit on 10/25/24.      Imaging: Reviewed in Epic.    Medications:    potassium chloride  40 mEq Intravenous Once    amLODIPine  5 mg Oral Daily    metoprolol succinate ER  100 mg Oral Daily Beta Blocker    pantoprazole  40 mg Oral QAM AC    enoxaparin  40 mg Subcutaneous Daily       Assessment & Plan:      #Abdominal pain from acute pancreatitis and pancreatic mass  Pain control  Bowel rest  IVF at 150cc/hr   Anti-emetics  GI following    #Pancreatic mass  Recommend outpt w/u with EUS and biopsy, sooner if warranted  GI following    #Etoh abuse  Cessation advised     #Dispo, pt decided to leave AMA, states she will drive 4 hours to the nearest hospital, drink water if she is able and will not eat on the way, wants to be closer to home and  would also like to take her there today to start the process of w/u for pancreatic mass.      Alexis Howard MD    Supplementary Documentation:     Quality:  DVT Mechanical Prophylaxis:   SCDs,    DVT Pharmacologic Prophylaxis   Medication    enoxaparin (Lovenox) 40 MG/0.4ML SUBQ injection 40 mg                Code Status: Not on file  Alvarado: No urinary catheter in place  Alvarado Duration (in days):   Central line:    BASSEM: 10/27/2024    Discharge is dependent on: progress  At this point Ms. Gonsales is expected to be discharge to: home    The 21st Century Cures Act makes medical notes like these available to patients in the interest of transparency. Please be advised this is a medical document. Medical documents are intended to carry relevant information, facts as evident, and the clinical opinion of the practitioner. The medical note is intended as peer to peer communication and may appear blunt or direct. It is written in medical language and may contain abbreviations or verbiage that are unfamiliar.

## 2024-10-27 NOTE — PLAN OF CARE
Assumed care at 0730.  A&O 4.  Room air.  Fluids- LR at 150 mL/hr.  Clear liquid diet with an order to advance as tolerated.   Tele- NSR/ST.  VTE- Lovenox.  Non cardiac electrolyte replacement protocol. Potassium (3.4) replaced. See managed orders and MAR for more details.   Independent after set up to the bathroom.     Pt oriented to the room, safety prec initiated, bed is in the lowest position and call light within reach. Pt and  at bedside updated on the plan of care and requesting to discharge today. MD's notified. All needs met at this time.    Problem: Patient/Family Goals  Goal: Patient/Family Long Term Goal  Description: Patient's Long Term Goal: Discharge home  Interventions:  - pain management  - nausea management  - GI consult  - See additional Care Plan goals for specific interventions  Outcome: Progressing  Goal: Patient/Family Short Term Goal  Description: Patient's Short Term Goal: \"be able to keep down food and liquid\"  Interventions:   - Bowel rest- NPO, sips with meds and ice chips  - Nausea management  - GI consult  - See additional Care Plan goals for specific interventions  Outcome: Progressing     Problem: DISCHARGE PLANNING  Goal: Discharge to home or other facility with appropriate resources  Description: INTERVENTIONS:  - Identify barriers to discharge w/pt and caregiver  - Include patient/family/discharge partner in discharge planning  - Arrange for needed discharge resources and transportation as appropriate  - Identify discharge learning needs (meds, wound care, etc)  - Arrange for interpreters to assist at discharge as needed  - Consider post-discharge preferences of patient/family/discharge partner  - Complete POLST form as appropriate  - Assess patient's ability to be responsible for managing their own health  - Refer to Case Management Department for coordinating discharge planning if the patient needs post-hospital services based on physician/LIP order or complex needs  related to functional status, cognitive ability or social support system  Outcome: Progressing     Problem: PAIN - ADULT  Goal: Verbalizes/displays adequate comfort level or patient's stated pain goal  Description: INTERVENTIONS:  - Encourage pt to monitor pain and request assistance  - Assess pain using appropriate pain scale  - Administer analgesics based on type and severity of pain and evaluate response  - Implement non-pharmacological measures as appropriate and evaluate response  - Consider cultural and social influences on pain and pain management  - Manage/alleviate anxiety  - Utilize distraction and/or relaxation techniques  - Monitor for opioid side effects  - Notify MD/LIP if interventions unsuccessful or patient reports new pain  - Anticipate increased pain with activity and pre-medicate as appropriate  Outcome: Progressing

## 2024-10-27 NOTE — PLAN OF CARE
Assumed patient care at 1930.  AxOx4  RA  NSR on tele  PRN pain medication given per MAR for abd pain and headache  Nausea reported antiemetics given per MAR, no vomiting  Clear liquid diet  Left hand PIV infusing LR at 150ml/hr  Up standby assist  Bed in lowest position  Call light within in reach  Needs met at this time    2007 - /109. MD notified. Amlodipine daily ordered.    Problem: Patient/Family Goals  Goal: Patient/Family Long Term Goal  Description: Patient's Long Term Goal: Discharge home    Interventions:  - pain management  - nausea management  - GI consult  - See additional Care Plan goals for specific interventions  Outcome: Progressing  Goal: Patient/Family Short Term Goal  Description: Patient's Short Term Goal: \"be able to keep down food and liquid\"    Interventions:   - Bowel rest- NPO, sips with meds and ice chips  - Nausea management  - GI consult  - See additional Care Plan goals for specific interventions  Outcome: Progressing     Problem: DISCHARGE PLANNING  Goal: Discharge to home or other facility with appropriate resources  Description: INTERVENTIONS:  - Identify barriers to discharge w/pt and caregiver  - Include patient/family/discharge partner in discharge planning  - Arrange for needed discharge resources and transportation as appropriate  - Identify discharge learning needs (meds, wound care, etc)  - Arrange for interpreters to assist at discharge as needed  - Consider post-discharge preferences of patient/family/discharge partner  - Complete POLST form as appropriate  - Assess patient's ability to be responsible for managing their own health  - Refer to Case Management Department for coordinating discharge planning if the patient needs post-hospital services based on physician/LIP order or complex needs related to functional status, cognitive ability or social support system  Outcome: Progressing     Problem: PAIN - ADULT  Goal: Verbalizes/displays adequate comfort level or  patient's stated pain goal  Description: INTERVENTIONS:  - Encourage pt to monitor pain and request assistance  - Assess pain using appropriate pain scale  - Administer analgesics based on type and severity of pain and evaluate response  - Implement non-pharmacological measures as appropriate and evaluate response  - Consider cultural and social influences on pain and pain management  - Manage/alleviate anxiety  - Utilize distraction and/or relaxation techniques  - Monitor for opioid side effects  - Notify MD/LIP if interventions unsuccessful or patient reports new pain  - Anticipate increased pain with activity and pre-medicate as appropriate  Outcome: Progressing

## 2024-10-27 NOTE — PROGRESS NOTES
[facility]                       Gastroenterology Follow up Note - [facility]    Yenifer Gonsales Patient Status:  Inpatient    1975 MRN [id number]   Location [facility] 3NE-A Attending Alexis Howadr MD   Hosp Day # 2 PCP No primary care provider on file.     Reason for consultation: Pancreatitis, pancreatic mass  Subjective: Patient with continued abdominal pain and discomfort.  She is passing flatus but no bowel movements.  She was able to take a small amount of clear liquids today.  Patient notes she has to leave today as they need to drive back to [place].  She does not want to try soft diet prior to leaving and she is concerned about increased pain on the drive.  Advised if she is unable to tolerate GI soft diet she will need to leave AGAINST MEDICAL ADVICE.  Review of Systems:   10 point ROS completed and was negative, except for pertinent positive and negatives stated in subjective.    For PMH, PSH, FHx and SHx- please see initial consult note.     Objective: BP (!) 162/95 (BP Location: Left arm)   Pulse 69   Temp 98.6 °F (37 °C) (Oral)   Resp 14   Ht 5' 11\" (1.803 m)   Wt 209 lb (94.8 kg)   SpO2 92%   BMI 29.15 kg/m²   Gen: No acute distress.   present in room  Resp: no respiratory distress  Abd: Soft, tender to palpation epigastric region, non-distended. No rebound tenderness, no guarding.   Neuro: Aox3.       Labs:   Lab Results   Component Value Date    WBC 4.2 10/27/2024    HGB 12.5 10/27/2024    HCT 35.8 10/27/2024    PLT 86.0 10/27/2024    CREATSERUM 0.52 10/27/2024    BUN <5 10/27/2024     10/27/2024    K 3.4 10/27/2024     10/27/2024    CO2 28.0 10/27/2024    GLU 79 10/27/2024    CA 9.3 10/27/2024    ALB 3.2 10/27/2024    ALKPHO 257 10/27/2024    BILT 2.0 10/27/2024     10/27/2024    ALT 37 10/27/2024    INR 0.99 10/27/2024    PTP 13.2 10/27/2024     Recent Labs   Lab 10/25/24  1754 10/26/24  0652 10/27/24  0650   * 90 79    BUN 7* 5* <5*   CREATSERUM 0.71 0.55 0.52*   CA 10.1 9.6 9.3    136 138   K 4.3 3.6 3.4*    106 105   CO2 24.0 27.0 28.0     Recent Labs   Lab 10/27/24  0650   RBC 3.45*   HGB 12.5   HCT 35.8   .8*   MCH 36.2*   MCHC 34.9   RDW 15.0   NEPRELIM 2.88   WBC 4.2   PLT 86.0*       Recent Labs   Lab 10/25/24  1754 10/26/24  0652 10/27/24  0650   ALT 29 33 37   AST 95* 171* 101*       Assessment:  Yenifer Gonsales is a 49 year old female with chronic medical conditions including recent episodes of recurrent acute alcoholic pancreatitis, thrombocytopenia, and continued alcohol use that presented to the ER with epigastric pain, nausea, emesis.     Epigastric pain, nausea, and emesis secondary to alcoholic pancreatitis -pain still persistent  Alcoholic pancreatitis-CT with evidence of pancreatitis and lipase greater than 3 times upper limit of normal along with epigastric abdominal pain  Alcohol abuse  Abnormal CT scan concerning for mass in the head of the pancreas with associated extrahepatic biliary ductal dilatation.  This was previously thought to be a pancreatic cyst likely due to her prior episodes of pancreatitis, however appears to have grown in size compared to previous CT worse measured at 2.2 cm now measuring 4.5 cm.  Elevated liver enzymes.  Suspect this is secondary to alcoholic hepatitis, however may also obstructive component with possible pancreatic head mass.  Stable  Thrombocytopenia-likely secondary to alcohol induced thrombocytopenia, however she was also noted to have varices on EGD from February 2024 so may have cirrhosis?  Slightly worse today  Possible alcoholic cirrhosis-noted to have varices on EGD from February 2024 along with thrombocytopenia.  CT however showing normal-appearing liver and albumin is within normal limits.        Recommendations:   Continue clear liquid diet as tolerated.  Discussed advancement to GI soft diet given patient needs to leave today.  She does not  want to try GI soft diet here prior to leaving as she is concerned it may increase her pain during her drive back to [place].  Pain control and antiemetics per primary  IV fluids with lactated Ringer's at 150 mL/h  Patient will need further workup and evaluation of possible pancreatic mass with endoscopic ultrasound. Recommend this be pursued as outpatient once acute pancreatitis episode resolves as acute inflammation will likely hinder visualization.  Additionally there are no current signs of cholangitis suggest urgent need for endoscopic ultrasound.  Additionally, given all of her cares and [place], she would be better suited to complete the workup there.  Discussed with interventional GI yesterday  Advised absolute alcohol cessation  Monitor CBC, CMP, and INR daily  Patient notes she needs to leave today back to [place] as her  has a work commitments.  Advise given we have not yet advance her diet as she can tolerate she will need to leave AGAINST MEDICAL ADVICE.  Patient is aware notes she gets this.  Risks were discussed with patient including worsening or continued pain dehydration likely need to kidney dysfunction and/or death.  Patient is aware and notes understanding of risks.    Thank you for allowing us to participate in patient's care. Please call us with any questions or concerns.  The GI consult service will continue to follow if she chooses to stay.     Cj Kline,   [facility]  [phone]

## 2024-10-31 NOTE — PAYOR COMM NOTE
Discharge Notification    Patient Name: MARK PEREZ  Payor: [employer]  Subscriber #: [id number]  Authorization Number: [id number]  Admit Date/Time: 10/25/2024 5:38 PM  Discharge Date/Time: 10/27/2024 11:17 AM      10/25/24 2142  Place in observation Once  Once     Completed     Service: Medical  Level of Care: Acute  Patient Class: Observation   Question: Diagnosis Answer: Acute pancreatitis, unspecified complication status, unspecified pancreatitis type (HCC)    10/25/24 2142

## 2025-05-23 ENCOUNTER — APPOINTMENT (OUTPATIENT)
Dept: CT IMAGING | Facility: CLINIC | Age: 50
End: 2025-05-23
Payer: MEDICARE

## 2025-05-23 ENCOUNTER — HOSPITAL ENCOUNTER (EMERGENCY)
Facility: CLINIC | Age: 50
Discharge: HOME | End: 2025-05-23
Attending: EMERGENCY MEDICINE
Payer: MEDICARE

## 2025-05-23 VITALS
HEART RATE: 70 BPM | SYSTOLIC BLOOD PRESSURE: 167 MMHG | BODY MASS INDEX: 24.08 KG/M2 | RESPIRATION RATE: 15 BRPM | DIASTOLIC BLOOD PRESSURE: 73 MMHG | TEMPERATURE: 97.5 F | HEIGHT: 71 IN | WEIGHT: 172 LBS | OXYGEN SATURATION: 100 %

## 2025-05-23 DIAGNOSIS — N12 PYELONEPHRITIS: ICD-10-CM

## 2025-05-23 DIAGNOSIS — N30.01 ACUTE CYSTITIS WITH HEMATURIA: Primary | ICD-10-CM

## 2025-05-23 LAB
ALBUMIN SERPL-MCNC: 4.1 G/DL (ref 3.5–5.2)
ALBUMIN/GLOB SERPL: 1.1 {RATIO}
ALP SERPL-CCNC: 152 U/L (ref 35–104)
ALT SERPL-CCNC: 10 U/L (ref 10–35)
ANION GAP SERPL CALCULATED.3IONS-SCNC: 15 MMOL/L (ref 9–16)
AST SERPL-CCNC: 23 U/L (ref 10–35)
BACTERIA URNS QL MICRO: ABNORMAL
BASOPHILS # BLD: 0.2 K/UL (ref 0–0.2)
BASOPHILS NFR BLD: 3 % (ref 0–2)
BILIRUB SERPL-MCNC: 0.4 MG/DL (ref 0–1.2)
BILIRUB UR QL STRIP: NEGATIVE
BUN SERPL-MCNC: 14 MG/DL (ref 6–20)
CALCIUM SERPL-MCNC: 10.7 MG/DL (ref 8.6–10.4)
CHARACTER UR: ABNORMAL
CHLORIDE SERPL-SCNC: 91 MMOL/L (ref 98–107)
CLARITY UR: ABNORMAL
CO2 SERPL-SCNC: 21 MMOL/L (ref 20–31)
COLOR UR: YELLOW
CREAT SERPL-MCNC: 1.4 MG/DL (ref 0.5–0.9)
EOSINOPHIL # BLD: 0.1 K/UL (ref 0–0.4)
EOSINOPHILS RELATIVE PERCENT: 2 % (ref 1–4)
EPI CELLS #/AREA URNS HPF: ABNORMAL /HPF (ref 0–5)
ERYTHROCYTE [DISTWIDTH] IN BLOOD BY AUTOMATED COUNT: 18.5 % (ref 12.5–15.4)
GFR, ESTIMATED: 46 ML/MIN/1.73M2
GLUCOSE SERPL-MCNC: 89 MG/DL (ref 74–99)
GLUCOSE UR STRIP-MCNC: NEGATIVE MG/DL
HCG UR QL: NEGATIVE
HCT VFR BLD AUTO: 38 % (ref 36–46)
HGB BLD-MCNC: 13 G/DL (ref 12–16)
HGB UR QL STRIP.AUTO: ABNORMAL
KETONES UR STRIP-MCNC: NEGATIVE MG/DL
LACTATE BLDV-SCNC: 1.8 MMOL/L (ref 0.5–1.9)
LEUKOCYTE ESTERASE UR QL STRIP: ABNORMAL
LIPASE SERPL-CCNC: 126 U/L (ref 13–60)
LYMPHOCYTES NFR BLD: 1.5 K/UL (ref 1–4.8)
LYMPHOCYTES RELATIVE PERCENT: 23 % (ref 24–44)
MAGNESIUM SERPL-MCNC: 1.9 MG/DL (ref 1.6–2.6)
MCH RBC QN AUTO: 33.4 PG (ref 26–34)
MCHC RBC AUTO-ENTMCNC: 34.2 G/DL (ref 31–37)
MCV RBC AUTO: 97.7 FL (ref 80–100)
MONOCYTES NFR BLD: 0.8 K/UL (ref 0.1–1.2)
MONOCYTES NFR BLD: 13 % (ref 2–11)
NEUTROPHILS NFR BLD: 59 % (ref 36–66)
NEUTS SEG NFR BLD: 3.8 K/UL (ref 1.8–7.7)
NITRITE UR QL STRIP: POSITIVE
PH UR STRIP: 5.5 [PH] (ref 5–8)
PLATELET # BLD AUTO: 299 K/UL (ref 140–450)
PMV BLD AUTO: 9.7 FL (ref 6–12)
POTASSIUM SERPL-SCNC: 4.4 MMOL/L (ref 3.7–5.3)
PROT SERPL-MCNC: 8 G/DL (ref 6.6–8.7)
PROT UR STRIP-MCNC: ABNORMAL MG/DL
RBC # BLD AUTO: 3.89 M/UL (ref 4–5.2)
RBC #/AREA URNS HPF: ABNORMAL /HPF (ref 0–2)
SODIUM SERPL-SCNC: 127 MMOL/L (ref 136–145)
SP GR UR STRIP: 1.02 (ref 1–1.03)
UROBILINOGEN UR STRIP-ACNC: NORMAL EU/DL (ref 0–1)
WBC #/AREA URNS HPF: ABNORMAL /HPF (ref 0–5)
WBC OTHER # BLD: 6.4 K/UL (ref 3.5–11)

## 2025-05-23 PROCEDURE — 87086 URINE CULTURE/COLONY COUNT: CPT

## 2025-05-23 PROCEDURE — 83605 ASSAY OF LACTIC ACID: CPT

## 2025-05-23 PROCEDURE — 74177 CT ABD & PELVIS W/CONTRAST: CPT

## 2025-05-23 PROCEDURE — 36415 COLL VENOUS BLD VENIPUNCTURE: CPT

## 2025-05-23 PROCEDURE — 81025 URINE PREGNANCY TEST: CPT

## 2025-05-23 PROCEDURE — 6360000002 HC RX W HCPCS: Performed by: EMERGENCY MEDICINE

## 2025-05-23 PROCEDURE — 87186 SC STD MICRODIL/AGAR DIL: CPT

## 2025-05-23 PROCEDURE — 2500000003 HC RX 250 WO HCPCS: Performed by: EMERGENCY MEDICINE

## 2025-05-23 PROCEDURE — 2580000003 HC RX 258: Performed by: EMERGENCY MEDICINE

## 2025-05-23 PROCEDURE — 99285 EMERGENCY DEPT VISIT HI MDM: CPT

## 2025-05-23 PROCEDURE — 87077 CULTURE AEROBIC IDENTIFY: CPT

## 2025-05-23 PROCEDURE — 81001 URINALYSIS AUTO W/SCOPE: CPT

## 2025-05-23 PROCEDURE — 87040 BLOOD CULTURE FOR BACTERIA: CPT

## 2025-05-23 PROCEDURE — 83735 ASSAY OF MAGNESIUM: CPT

## 2025-05-23 PROCEDURE — 85025 COMPLETE CBC W/AUTO DIFF WBC: CPT

## 2025-05-23 PROCEDURE — 6360000004 HC RX CONTRAST MEDICATION: Performed by: EMERGENCY MEDICINE

## 2025-05-23 PROCEDURE — 83690 ASSAY OF LIPASE: CPT

## 2025-05-23 PROCEDURE — 80053 COMPREHEN METABOLIC PANEL: CPT

## 2025-05-23 PROCEDURE — 96374 THER/PROPH/DIAG INJ IV PUSH: CPT

## 2025-05-23 RX ORDER — FLUCONAZOLE 150 MG/1
150 TABLET ORAL DAILY
Qty: 7 TABLET | Refills: 0 | Status: SHIPPED | OUTPATIENT
Start: 2025-05-23 | End: 2025-05-30

## 2025-05-23 RX ORDER — SODIUM CHLORIDE 0.9 % (FLUSH) 0.9 %
10 SYRINGE (ML) INJECTION PRN
Status: DISCONTINUED | OUTPATIENT
Start: 2025-05-23 | End: 2025-05-24 | Stop reason: HOSPADM

## 2025-05-23 RX ORDER — 0.9 % SODIUM CHLORIDE 0.9 %
30 INTRAVENOUS SOLUTION INTRAVENOUS ONCE
Status: COMPLETED | OUTPATIENT
Start: 2025-05-23 | End: 2025-05-23

## 2025-05-23 RX ORDER — 0.9 % SODIUM CHLORIDE 0.9 %
70 INTRAVENOUS SOLUTION INTRAVENOUS ONCE
Status: DISCONTINUED | OUTPATIENT
Start: 2025-05-23 | End: 2025-05-24 | Stop reason: HOSPADM

## 2025-05-23 RX ORDER — CIPROFLOXACIN 500 MG/1
500 TABLET, FILM COATED ORAL 2 TIMES DAILY
Qty: 20 TABLET | Refills: 0 | Status: SHIPPED | OUTPATIENT
Start: 2025-05-23 | End: 2025-06-02

## 2025-05-23 RX ORDER — IOPAMIDOL 755 MG/ML
75 INJECTION, SOLUTION INTRAVASCULAR
Status: COMPLETED | OUTPATIENT
Start: 2025-05-23 | End: 2025-05-23

## 2025-05-23 RX ORDER — OXYCODONE AND ACETAMINOPHEN 5; 325 MG/1; MG/1
1 TABLET ORAL EVERY 4 HOURS PRN
COMMUNITY

## 2025-05-23 RX ADMIN — SODIUM CHLORIDE 2340 ML: 0.9 INJECTION, SOLUTION INTRAVENOUS at 18:06

## 2025-05-23 RX ADMIN — WATER 1000 MG: 1 INJECTION INTRAMUSCULAR; INTRAVENOUS; SUBCUTANEOUS at 18:31

## 2025-05-23 RX ADMIN — IOPAMIDOL 75 ML: 755 INJECTION, SOLUTION INTRAVENOUS at 18:47

## 2025-05-23 RX ADMIN — SODIUM CHLORIDE, PRESERVATIVE FREE 10 ML: 5 INJECTION INTRAVENOUS at 18:48

## 2025-05-23 RX ADMIN — Medication 100 ML: at 18:48

## 2025-05-23 ASSESSMENT — PAIN SCALES - GENERAL: PAINLEVEL_OUTOF10: 7

## 2025-05-23 ASSESSMENT — ENCOUNTER SYMPTOMS
DIARRHEA: 0
NAUSEA: 0
SHORTNESS OF BREATH: 0
ABDOMINAL PAIN: 0
RHINORRHEA: 0
COUGH: 0
EYE PAIN: 0
BACK PAIN: 0
VOMITING: 0
SORE THROAT: 0

## 2025-05-23 ASSESSMENT — PAIN - FUNCTIONAL ASSESSMENT: PAIN_FUNCTIONAL_ASSESSMENT: 0-10

## 2025-05-23 ASSESSMENT — PAIN DESCRIPTION - LOCATION: LOCATION: BACK

## 2025-05-23 ASSESSMENT — PAIN DESCRIPTION - ORIENTATION: ORIENTATION: RIGHT;LEFT

## 2025-05-23 ASSESSMENT — PAIN DESCRIPTION - FREQUENCY: FREQUENCY: CONTINUOUS

## 2025-05-23 ASSESSMENT — PAIN DESCRIPTION - DESCRIPTORS: DESCRIPTORS: ACHING

## 2025-05-23 ASSESSMENT — PAIN DESCRIPTION - PAIN TYPE: TYPE: ACUTE PAIN

## 2025-05-23 NOTE — ED NOTES
Pt presents to the ED via private auto. Pt states she has been experiencing low to mid back pain, discomfort with urination, and cloudy dark urine x2 weeks. Pt has completed Bactrim DS, was not seen for same due to recent back surgery.

## 2025-05-23 NOTE — ED PROVIDER NOTES
[facility]  [address] [address]  Phone: [phone]      Patient Name:  Thu De Guzman  Medical Record Number:  [id number]  YOB: 1975  Date of Service:  5/23/2025  Primary Care Physician:  Diego Pratt MD      CHIEF COMPLAINT:       Chief Complaint   Patient presents with    Dysuria     X2 weeks       HISTORY OF PRESENT ILLNESS:    Thu De Guzman is a 50 y.o. female who presents with flank pain.  Slightly worse on the right.  Fairly significant prior medical history.  About 2 months ago had back surgery that was unremarkable.  She reports feeling dysuria and her PCP called her in Bactrim.  She has been off of that for about 4 to 5 days now.  Still having the symptoms and seems like it is getting worse.  She tells me she has a history of sepsis secondary to UTI/pyelonephritis and renal abscess.  She needed a fairly extensive hospital stay with PICC line antibiotics.  That was well over a year ago.  Denies any fevers.  Denies other symptoms or concerns such as nausea or vomiting.    REVIEW OF SYSTEMS:     Review of Systems   Constitutional:  Negative for chills, fatigue and fever.   HENT:  Negative for rhinorrhea and sore throat.    Eyes:  Negative for pain.   Respiratory:  Negative for cough and shortness of breath.    Cardiovascular:  Negative for chest pain.   Gastrointestinal:  Negative for abdominal pain, diarrhea, nausea and vomiting.   Genitourinary:  Positive for dysuria and flank pain. Negative for difficulty urinating.   Musculoskeletal:  Negative for back pain and neck pain.   Skin:  Negative for rash.   Neurological:  Negative for weakness and headaches.         CURRENT MEDICATIONS:      Discharge Medication List as of 5/23/2025  9:50 PM        CONTINUE these medications which have NOT CHANGED    Details   lipase-protease-amylase (CREON) 41243-12154 units delayed release capsule Take 1 capsule by mouth 3 times daily (with meals), Oral, 3 TIMES DAILY  DATA      Sepsis Criteria   Severe Sepsis Criteria   Septic Shock Criteria       Must meet 2:    []Temp >100.9 F (38.3 C) or < 96.8 F (36 C)  [x]HR > 90  []RR > 20  []WBC > 12 or < 4 or 10% bands    AND:    [x] Infection Confirmed or Suspected.     Must meet 1:    []Lactate > 2       or   []Signs of Organ Dysfunction:    - SBP < 90 or MAP < 65  -Creatinine > 2 or increased from baseline  -Urine Output < 0.5 ml/kg/hr  -Bilirubin > 2  -INR > 1.5 (not anticoagulated)  -Platelets < 100,000  -Acute Respiratory Failure as evidenced by new need for NIPPV or mechanical ventilation   Must meet 1:    []Lactate > 4        or   []SBP < 90 or MAP < 65 for at least two readings in the first hour after fluid bolus administration    []Vasopressors initiated (if hypotension persists after fluid resuscitation)   Patient Vitals for the past 6 hrs:   BP Temp Pulse Resp SpO2 Height Weight Weight Method Percent Weight Change   05/23/25 1720 (!) 119/91 97.5 °F (36.4 °C) (!) 129 16 100 % 1.803 m (5' 11\") 78 kg (172 lb) Stated 0      No results for input(s): \"WBC\", \"LACTA\", \"LACTACIDWB\", \"CREATININE\", \"BILITOT\", \"INR\", \"PLT\" in the last 72 hours.     ***    Fluid Resuscitation Rationale: at least 30mL/kg based on entered actual weight at time of triage    Repeat lactate level: {Repeat lactic acid:960631}    Reassessment Exam: {Sepsis reassessment:604336}     DIFFERENTIAL DIAGNOSIS:     UTI versus nephritis versus other unknown etiology at this time including sepsis.    PLAN:     Orders Placed This Encounter   Procedures    Culture, Blood 1    Culture, Blood 1    CT ABDOMEN PELVIS W IV CONTRAST Additional Contrast? None    Urinalysis with Reflex to Culture    Comprehensive Metabolic Panel    CBC with Auto Differential    Lipase    Magnesium    Lactate, Sepsis    Insert peripheral IV       MEDICATIONS ORDERED:     Orders Placed This Encounter   Medications    sodium chloride 0.9 % bolus 2,340 mL       DIAGNOSTIC RESULTS:     EKG

## 2025-05-24 LAB
MICROORGANISM SPEC CULT: ABNORMAL
SPECIMEN DESCRIPTION: ABNORMAL

## 2025-05-24 NOTE — ED PROVIDER NOTES
FACULTY SIGN-OUT  ADDENDUM     Care of this patient was assumed from Dr. Horne.  The patient was seen for Dysuria (X2 weeks)  .  The patient's initial evaluation and plan have been discussed with the prior provider who initially evaluated the patient.  Nursing Notes, Past Medical Hx, Past Surgical Hx, Social Hx, Allergies, and Family Hx were all reviewed.      ED COURSE      The patient was given the following medications:  Orders Placed This Encounter   Medications    sodium chloride 0.9 % bolus 2,340 mL    cefTRIAXone (ROCEPHIN) 1,000 mg in sterile water 10 mL IV syringe     Antimicrobial Indications:   Urinary Tract Infection    sodium chloride flush 0.9 % injection 10 mL    sodium chloride 0.9 % bolus 70 mL    iopamidol (ISOVUE-370) 76 % injection 75 mL    ciprofloxacin (CIPRO) 500 MG tablet     Sig: Take 1 tablet by mouth 2 times daily for 10 days     Dispense:  20 tablet     Refill:  0       Labs Reviewed   URINALYSIS WITH REFLEX TO CULTURE - Abnormal; Notable for the following components:       Result Value    Turbidity UA Turbid (*)     Urine Hgb SMALL (*)     Protein, UA 1+ (*)     Nitrite, Urine POSITIVE (*)     Leukocyte Esterase, Urine MODERATE (*)     All other components within normal limits   COMPREHENSIVE METABOLIC PANEL - Abnormal; Notable for the following components:    Sodium 127 (*)     Chloride 91 (*)     Creatinine 1.4 (*)     Est, Glom Filt Rate 46 (*)     Calcium 10.7 (*)     Alkaline Phosphatase 152 (*)     All other components within normal limits   CBC WITH AUTO DIFFERENTIAL - Abnormal; Notable for the following components:    RBC 3.89 (*)     RDW 18.5 (*)     Lymphocytes % 23 (*)     Monocytes % 13 (*)     Basophils % 3 (*)     All other components within normal limits   LIPASE - Abnormal; Notable for the following components:    Lipase 126 (*)     All other components within normal limits   MICROSCOPIC URINALYSIS - Abnormal; Notable for the following components:    Bacteria, UA  complex, facet about results in mild-to-moderate spinal canal stenosis. Bilateral neuroforaminal stenosis. C5-C6: Large discussed by complex, ligamentum flavum hypertrophy results in severe spinal canal stenosis, ventral cord flattening. No distinct cord signal abnormality. Severe right greater than left neural foraminal narrowing. C6-C7: Mild broad-based disc osteophyte complex, ligament flavum hypertrophy and facet and uncovertebral results in moderate spinal canal stenosis with moderate to severe bilateral neural foraminal narrowing. C7-T1: No significant spinal canal or neural foraminal narrowing. IMPRESSION: *  Multilevel degenerative changes most significant at C5-C6 where there is severe spinal canal stenosis and severe bilateral neural foraminal narrowing. Additional degenerative changes are noted, as described. *  Mildly prominent presumed right supraclavicular lymph node. The etiology of this is of unknown clinical significance. This may be more sensitively assessed with CT Soft tissue neck if clinically warranted. Workstation:[id number] Finalized by Alessandro Yoo on 5/23/2025 11:27 AM    MRI LUMBAR SPINE WO CONTRAST  Result Date: 5/15/2025  EXAM: MRI LUMBAR SPINE WITHOUT CONTRAST CLINICAL HISTORY: Status post lumbar fusion with new symptoms and weakness in both lower extremities. Multiple falls. TECHNIQUE: Routine unenhanced MRI of the lumbar spine was obtained. COMPARISONS: MRI dated 3/18/2025 FINDINGS: The lumbar spine maintains a normal lordotic curvature. There are postsurgical changes compatible with interval laminectomies and posterior spinal fusion spanning L3-L5 with L4-5 disc space device since the most recent 3/18/2025 comparison examination. There remains chronic minimal grade 1 degenerative anterolisthesis of L3 on L4 and L4 on L5. There is a fluid collection within the midline of the dorsal subcutaneous fat overlying the paraspinal musculature measuring 2.9 x 1.6 on a transverse and an

## 2025-05-25 LAB
MICROORGANISM SPEC CULT: NORMAL
MICROORGANISM SPEC CULT: NORMAL
SERVICE CMNT-IMP: NORMAL
SERVICE CMNT-IMP: NORMAL
SPECIMEN DESCRIPTION: NORMAL
SPECIMEN DESCRIPTION: NORMAL

## 2025-05-26 LAB
MICROORGANISM SPEC CULT: ABNORMAL
SPECIMEN DESCRIPTION: ABNORMAL

## 2025-05-28 ENCOUNTER — RESULTS FOLLOW-UP (OUTPATIENT)
Dept: EMERGENCY DEPT | Facility: CLINIC | Age: 50
End: 2025-05-28
